# Patient Record
Sex: MALE | Race: WHITE | URBAN - METROPOLITAN AREA
[De-identification: names, ages, dates, MRNs, and addresses within clinical notes are randomized per-mention and may not be internally consistent; named-entity substitution may affect disease eponyms.]

---

## 2022-09-03 ENCOUNTER — HOSPITAL ENCOUNTER (EMERGENCY)
Age: 77
Discharge: HOME | End: 2022-09-03
Attending: EMERGENCY MEDICINE
Payer: MEDICARE

## 2022-09-03 ENCOUNTER — APPOINTMENT (OUTPATIENT)
Dept: GENERAL RADIOLOGY | Age: 77
End: 2022-09-03
Payer: MEDICARE

## 2022-09-03 VITALS
WEIGHT: 200 LBS | OXYGEN SATURATION: 95 % | DIASTOLIC BLOOD PRESSURE: 107 MMHG | BODY MASS INDEX: 29.62 KG/M2 | HEART RATE: 47 BPM | TEMPERATURE: 98.1 F | SYSTOLIC BLOOD PRESSURE: 193 MMHG | HEIGHT: 69 IN | RESPIRATION RATE: 14 BRPM

## 2022-09-03 DIAGNOSIS — M25.512 ACUTE PAIN OF LEFT SHOULDER: Primary | ICD-10-CM

## 2022-09-03 LAB
ANION GAP SERPL CALCULATED.3IONS-SCNC: 11 MMOL/L (ref 3–16)
BASE EXCESS VENOUS: 5.6 MMOL/L (ref -2–3)
BASOPHILS ABSOLUTE: 0.1 K/UL (ref 0–0.2)
BASOPHILS RELATIVE PERCENT: 0.5 %
BUN BLDV-MCNC: 19 MG/DL (ref 7–20)
CALCIUM SERPL-MCNC: 8.8 MG/DL (ref 8.3–10.6)
CARBOXYHEMOGLOBIN: 0.8 % (ref 0–1.5)
CHLORIDE BLD-SCNC: 103 MMOL/L (ref 99–110)
CO2: 28 MMOL/L (ref 21–32)
CREAT SERPL-MCNC: 0.7 MG/DL (ref 0.8–1.3)
EKG ATRIAL RATE: 53 BPM
EKG DIAGNOSIS: NORMAL
EKG P AXIS: 16 DEGREES
EKG P-R INTERVAL: 132 MS
EKG Q-T INTERVAL: 484 MS
EKG QRS DURATION: 96 MS
EKG QTC CALCULATION (BAZETT): 454 MS
EKG R AXIS: -14 DEGREES
EKG T AXIS: 34 DEGREES
EKG VENTRICULAR RATE: 53 BPM
EOSINOPHILS ABSOLUTE: 0.1 K/UL (ref 0–0.6)
EOSINOPHILS RELATIVE PERCENT: 0.8 %
GFR AFRICAN AMERICAN: >60
GFR NON-AFRICAN AMERICAN: >60
GLUCOSE BLD-MCNC: 104 MG/DL (ref 70–99)
HCO3 VENOUS: 30.3 MMOL/L (ref 24–28)
HCT VFR BLD CALC: 39.2 % (ref 40.5–52.5)
HEMOGLOBIN, VEN, REDUCED: 8.4 %
HEMOGLOBIN: 13.5 G/DL (ref 13.5–17.5)
LACTIC ACID: 1.6 MMOL/L (ref 0.4–2)
LYMPHOCYTES ABSOLUTE: 2.2 K/UL (ref 1–5.1)
LYMPHOCYTES RELATIVE PERCENT: 20.9 %
MCH RBC QN AUTO: 31.3 PG (ref 26–34)
MCHC RBC AUTO-ENTMCNC: 34.3 G/DL (ref 31–36)
MCV RBC AUTO: 91.2 FL (ref 80–100)
METHEMOGLOBIN VENOUS: 0.1 % (ref 0–1.5)
MONOCYTES ABSOLUTE: 0.7 K/UL (ref 0–1.3)
MONOCYTES RELATIVE PERCENT: 6.3 %
NEUTROPHILS ABSOLUTE: 7.6 K/UL (ref 1.7–7.7)
NEUTROPHILS RELATIVE PERCENT: 71.5 %
O2 SAT, VEN: 92 %
PCO2, VEN: 43.9 MMHG (ref 41–51)
PDW BLD-RTO: 15.3 % (ref 12.4–15.4)
PH VENOUS: 7.45 (ref 7.35–7.45)
PLATELET # BLD: 172 K/UL (ref 135–450)
PMV BLD AUTO: 8.4 FL (ref 5–10.5)
PO2, VEN: 60.1 MMHG (ref 25–40)
POTASSIUM REFLEX MAGNESIUM: 3.8 MMOL/L (ref 3.5–5.1)
PRO-BNP: 252 PG/ML (ref 0–449)
RBC # BLD: 4.3 M/UL (ref 4.2–5.9)
REASON FOR REJECTION: NORMAL
REJECTED TEST: NORMAL
SODIUM BLD-SCNC: 142 MMOL/L (ref 136–145)
TCO2 CALC VENOUS: 32 MMOL/L
TROPONIN: <0.01 NG/ML
TROPONIN: <0.01 NG/ML
WBC # BLD: 10.7 K/UL (ref 4–11)

## 2022-09-03 PROCEDURE — 93005 ELECTROCARDIOGRAM TRACING: CPT | Performed by: EMERGENCY MEDICINE

## 2022-09-03 PROCEDURE — 73030 X-RAY EXAM OF SHOULDER: CPT

## 2022-09-03 PROCEDURE — 85025 COMPLETE CBC W/AUTO DIFF WBC: CPT

## 2022-09-03 PROCEDURE — 82803 BLOOD GASES ANY COMBINATION: CPT

## 2022-09-03 PROCEDURE — 71045 X-RAY EXAM CHEST 1 VIEW: CPT

## 2022-09-03 PROCEDURE — 84484 ASSAY OF TROPONIN QUANT: CPT

## 2022-09-03 PROCEDURE — 83605 ASSAY OF LACTIC ACID: CPT

## 2022-09-03 PROCEDURE — 83880 ASSAY OF NATRIURETIC PEPTIDE: CPT

## 2022-09-03 PROCEDURE — 99285 EMERGENCY DEPT VISIT HI MDM: CPT

## 2022-09-03 PROCEDURE — 36415 COLL VENOUS BLD VENIPUNCTURE: CPT

## 2022-09-03 PROCEDURE — 80048 BASIC METABOLIC PNL TOTAL CA: CPT

## 2022-09-03 ASSESSMENT — PAIN DESCRIPTION - ORIENTATION: ORIENTATION: LEFT;MID

## 2022-09-03 ASSESSMENT — PAIN SCALES - GENERAL: PAINLEVEL_OUTOF10: 3

## 2022-09-03 ASSESSMENT — PAIN DESCRIPTION - FREQUENCY: FREQUENCY: INTERMITTENT

## 2022-09-03 ASSESSMENT — PAIN - FUNCTIONAL ASSESSMENT
PAIN_FUNCTIONAL_ASSESSMENT: 0-10
PAIN_FUNCTIONAL_ASSESSMENT: ACTIVITIES ARE NOT PREVENTED

## 2022-09-03 ASSESSMENT — ENCOUNTER SYMPTOMS
SHORTNESS OF BREATH: 0
VOMITING: 0
NAUSEA: 0
EYES NEGATIVE: 1
RHINORRHEA: 0
COUGH: 0
SORE THROAT: 0
RESPIRATORY NEGATIVE: 1
ABDOMINAL PAIN: 0

## 2022-09-03 ASSESSMENT — PAIN DESCRIPTION - ONSET: ONSET: SUDDEN

## 2022-09-03 ASSESSMENT — PAIN DESCRIPTION - LOCATION: LOCATION: CHEST

## 2022-09-03 ASSESSMENT — PAIN DESCRIPTION - PAIN TYPE: TYPE: ACUTE PAIN

## 2022-09-03 ASSESSMENT — PAIN DESCRIPTION - DESCRIPTORS: DESCRIPTORS: ACHING

## 2022-09-03 NOTE — DISCHARGE INSTRUCTIONS
As discussed, the evaluation of your heart was very reassuring, and your pain seems to be localized in the left shoulder, without any evidence of bony injury. Please continue taking all home medications as prescribed. Please return to the emergency department for worsening symptoms or other concerns.

## 2022-09-03 NOTE — ED PROVIDER NOTES
[address] [facility]          ATTENDING PHYSICIAN NOTE       Date of evaluation: 9/3/2022    Chief Complaint     Chest Pain (Pt states having mid/left chest pain that began around 3 am. Pain radiates to L shoulder. ) and Shoulder Pain      History of Present Illness     Michoacano Xie is a 78 y.o. male who presents to the emergency department by EMS from his long-term care facility with complaints of left shoulder pain. EMS reports that they were called to the nursing facility for chest pain. The patient describes to me that he has no chest pain, but he has pain in the anterior aspect of his left shoulder. Patient does not recall having any difficulties with shoulder pain in the past.  He states that he awoke at about 3:00 in the morning with a \"twinge\" of achy pain in the left shoulder. He describes his pain as 3 out of 10 in intensity, constant since onset, without exacerbating or alleviating factors. He denies any radiation of pain into the chest or into the back, neck, or jaw. He does feel that it is worse with certain movements of the arm. He does not recall any trauma or strain to the shoulder or arm recently. Patient states that he told the nurses aide who came to check on him about this pain, \"and then I found myself here. \"  He denies any shortness of breath. He denies any nausea or vomiting. He denies any dizziness or lightheadedness. On review of records, it appears that the patient has recently moved from a long-term care facility in [place] to here in [place] to be closer to family. There are no records available in our system, but his problem list from the nursing facility does list hypertension, dementia, and coronary artery disease, among others. Review of Systems     Review of Systems   Constitutional: Negative. Negative for activity change, chills, fatigue and fever. HENT: Negative. Negative for congestion, rhinorrhea and sore throat. Eyes: Negative. Negative for visual disturbance. Respiratory: Negative. Negative for cough and shortness of breath. Cardiovascular: Negative. Negative for chest pain, palpitations and leg swelling. Gastrointestinal:  Negative for abdominal pain, nausea and vomiting. Genitourinary:  Negative for difficulty urinating and dysuria. Musculoskeletal:  Positive for arthralgias. Left shoulder pain   Skin: Negative. Neurological: Negative. Negative for dizziness, light-headedness and headaches. Psychiatric/Behavioral: Negative. Past Medical, Surgical, Family, and Social History     He has a past medical history of Hypertension. He has a past surgical history that includes Appendectomy. His family history is not on file. He reports that he has never smoked. He has never used smokeless tobacco. He reports current alcohol use. He reports that he does not currently use drugs. Medications     There are no discharge medications for this patient. Allergies     He has No Known Allergies. Physical Exam     INITIAL VITALS: BP: (!) 158/98, Temp: 98.1 °F (36.7 °C), Heart Rate: 50, Resp: 16, SpO2: 95 %     General: Well appearing older gentleman. Soft-spoken, but pleasantly conversational, and in NAD. HEENT: Pupils are equal, round, and reactive to light. Extraocular muscles are intact. Conjunctivae are clear and moist. No redness or drainage from the eyes. No drainage from the nose. The oropharynx appeared to be normal.    Neck: Supple, with full range of motion. Chest: Not tender to palpation. Cardiovascular: Normal S1-S2 without murmur rub or gallop. 2+ radial pulses bilaterally. Respiratory: Unlabored breathing with equal chest rise and fall. Lungs are clear to auscultation bilaterally. No adventitious lung sounds heard. Abdomen: Soft and nontender, without guarding or rebound tenderness. No masses or hepatosplenomegaly.     Skin: Warm and dry, without rashes or ecchymoses, lacerations or abrasions. Neuro: Alert and oriented to person and place, although not to time. No focal neurologic deficits are noted. Extremities: Warm and well-perfused, without clubbing, cyanosis, or edema. The patient moves all extremities equally. On focused examination of the left upper extremity, there is no obvious deformity. The patient has full range of motion without limitation by pain, although he does have some pain in the anterior shoulder with internal rotation. He has mild tenderness to palpation over the anterior joint line. No edema or effusion. Psych: The patient's mood and affect are generally within normal limits for their presentation. Diagnostic Results     EKG   Sinus bradycardia with a ventricular rate of 53 bpm.  Leftward axis. Normal intervals, with WV interval 132 ms, QRS duration 96 ms,  ms. There is poor R wave progression in the anterior precordial leads. No ST segment changes are noted. There is no prior EKG to compare to in our system. RADIOLOGY:  XR CHEST PORTABLE   Final Result   1. No acute disease. XR SHOULDER LEFT (MIN 2 VIEWS)   Final Result   1. No acute osseous abnormality.           LABS:   Results for orders placed or performed during the hospital encounter of 40/72/83   Basic Metabolic Panel w/ Reflex to MG   Result Value Ref Range    Sodium 142 136 - 145 mmol/L    Potassium reflex Magnesium 3.8 3.5 - 5.1 mmol/L    Chloride 103 99 - 110 mmol/L    CO2 28 21 - 32 mmol/L    Anion Gap 11 3 - 16    Glucose 104 (H) 70 - 99 mg/dL    BUN 19 7 - 20 mg/dL    Creatinine 0.7 (L) 0.8 - 1.3 mg/dL    GFR Non-African American >60 >60    GFR African American >60 >60    Calcium 8.8 8.3 - 10.6 mg/dL   Brain Natriuretic Peptide   Result Value Ref Range    Pro- 0 - 449 pg/mL   Troponin   Result Value Ref Range    Troponin <0.01 <0.01 ng/mL   Blood gas, venous (Lab)   Result Value Ref Range    pH, Sree 7.448 7.350 - 7.450    pCO2, Sree 43.9 41.0 - 51.0 mmHg    pO2, Sree 60.1 (H) 25.0 - 40.0 mmHg    HCO3, Venous 30.3 (H) 24.0 - 28.0 mmol/L    Base Excess, Sree 5.6 (H) -2.0 - 3.0 mmol/L    O2 Sat, Sree 92 Not established %    Carboxyhemoglobin 0.8 0.0 - 1.5 %    MetHgb, Sree 0.1 0.0 - 1.5 %    TC02 (Calc), Sree 32 mmol/L    Hemoglobin, Sree, Reduced 8.40 %   Lactic Acid   Result Value Ref Range    Lactic Acid 1.6 0.4 - 2.0 mmol/L   SPECIMEN REJECTION   Result Value Ref Range    Rejected Test CBCWD     Reason for Rejection see below    CBC with Auto Differential   Result Value Ref Range    WBC 10.7 4.0 - 11.0 K/uL    RBC 4.30 4.20 - 5.90 M/uL    Hemoglobin 13.5 13.5 - 17.5 g/dL    Hematocrit 39.2 (L) 40.5 - 52.5 %    MCV 91.2 80.0 - 100.0 fL    MCH 31.3 26.0 - 34.0 pg    MCHC 34.3 31.0 - 36.0 g/dL    RDW 15.3 12.4 - 15.4 %    Platelets 581 407 - 711 K/uL    MPV 8.4 5.0 - 10.5 fL    Neutrophils % 71.5 %    Lymphocytes % 20.9 %    Monocytes % 6.3 %    Eosinophils % 0.8 %    Basophils % 0.5 %    Neutrophils Absolute 7.6 1.7 - 7.7 K/uL    Lymphocytes Absolute 2.2 1.0 - 5.1 K/uL    Monocytes Absolute 0.7 0.0 - 1.3 K/uL    Eosinophils Absolute 0.1 0.0 - 0.6 K/uL    Basophils Absolute 0.1 0.0 - 0.2 K/uL   Troponin   Result Value Ref Range    Troponin <0.01 <0.01 ng/mL   EKG 12 Lead   Result Value Ref Range    Ventricular Rate 53 BPM    Atrial Rate 53 BPM    P-R Interval 132 ms    QRS Duration 96 ms    Q-T Interval 484 ms    QTc Calculation (Bazett) 454 ms    P Axis 16 degrees    R Axis -14 degrees    T Axis 34 degrees    Diagnosis       EKG performed in ER and to be interpreted by ER physician. Confirmed by MD, ER ([id number]),  Meryle Lay ([id number]) on 9/3/2022 7:48:03 AM       RECENT VITALS:  BP: (!) 193/107, Temp: 98.1 °F (36.7 °C), Heart Rate: (!) 47, Resp: 14, SpO2: 95 %     Procedures       ED Course     Nursing Notes, Past Medical Hx, Past Surgical Hx, Social Hx, Allergies, and Family Hx were reviewed.     The patient was given the following medications:  No orders of the defined types were placed in this encounter. CONSULTS:  None    MEDICAL DECISION MAKING / ASSESSMENT / PLAN     Jennifer Santos is a 78 y.o. male who presents to the emergency department from his long-term care facility with complaints of anterior left shoulder pain. According to EMS, the nursing facility called in for chest pain, but the patient firmly and repeatedly denies any chest pain to me. He has reproducible tenderness over the anterior joint line of the shoulder, with no radiation of pain into the chest, neck, or jaw. His pain is reproduced with certain maneuvers, including internal rotation of the shoulder. Given his age and some risk factors, cardiac work-up was pursued, but this was reassuring, with no abnormalities. Shoulder films show no acute bony abnormality. The patient is thereafter considered stable for discharge. Clinical Impression     1. Acute pain of left shoulder        Disposition     PATIENT REFERRED TO:  [facility] Emergency Department  [address] [place] [address]  [phone]    If symptoms worsen    DISCHARGE MEDICATIONS:  There are no discharge medications for this patient. DISPOSITION Decision To Discharge    (Please note that portions of this note were completed with voice recognition software.   Efforts were made to edit the dictations but occasionally words are mis-transcribed.)     Tara Wright MD  09/05/22 [id number]

## 2022-09-03 NOTE — ED NOTES
Pt discharged with written instructions. Pts son verbalizes understanding. Heplock removed and pt walked to [facility] with assistance.      Mane Saavedra RN  09/03/22 [id number]

## 2022-10-24 ENCOUNTER — ANESTHESIA EVENT (OUTPATIENT)
Dept: ENDOSCOPY | Age: 77
End: 2022-10-24
Payer: MEDICARE

## 2022-10-25 ENCOUNTER — HOSPITAL ENCOUNTER (OUTPATIENT)
Age: 77
Setting detail: OUTPATIENT SURGERY
Discharge: HOME | End: 2022-10-25
Attending: INTERNAL MEDICINE | Admitting: INTERNAL MEDICINE
Payer: MEDICARE

## 2022-10-25 ENCOUNTER — ANESTHESIA (OUTPATIENT)
Dept: ENDOSCOPY | Age: 77
End: 2022-10-25
Payer: MEDICARE

## 2022-10-25 VITALS
SYSTOLIC BLOOD PRESSURE: 153 MMHG | DIASTOLIC BLOOD PRESSURE: 83 MMHG | RESPIRATION RATE: 16 BRPM | TEMPERATURE: 96.8 F | HEART RATE: 59 BPM | OXYGEN SATURATION: 97 %

## 2022-10-25 DIAGNOSIS — Z87.19 HISTORY OF ULCERATIVE COLITIS: ICD-10-CM

## 2022-10-25 LAB — C DIFF TOXIN/ANTIGEN: NORMAL

## 2022-10-25 PROCEDURE — 88305 TISSUE EXAM BY PATHOLOGIST: CPT

## 2022-10-25 PROCEDURE — 2580000003 HC RX 258: Performed by: INTERNAL MEDICINE

## 2022-10-25 PROCEDURE — 87449 NOS EACH ORGANISM AG IA: CPT

## 2022-10-25 PROCEDURE — 2500000003 HC RX 250 WO HCPCS: Performed by: NURSE ANESTHETIST, CERTIFIED REGISTERED

## 2022-10-25 PROCEDURE — 3700000000 HC ANESTHESIA ATTENDED CARE: Performed by: INTERNAL MEDICINE

## 2022-10-25 PROCEDURE — 2709999900 HC NON-CHARGEABLE SUPPLY: Performed by: INTERNAL MEDICINE

## 2022-10-25 PROCEDURE — 6360000002 HC RX W HCPCS: Performed by: NURSE ANESTHETIST, CERTIFIED REGISTERED

## 2022-10-25 PROCEDURE — 87493 C DIFF AMPLIFIED PROBE: CPT

## 2022-10-25 PROCEDURE — 7100000010 HC PHASE II RECOVERY - FIRST 15 MIN: Performed by: INTERNAL MEDICINE

## 2022-10-25 PROCEDURE — 7100000011 HC PHASE II RECOVERY - ADDTL 15 MIN: Performed by: INTERNAL MEDICINE

## 2022-10-25 PROCEDURE — 87324 CLOSTRIDIUM AG IA: CPT

## 2022-10-25 PROCEDURE — 3609010300 HC COLONOSCOPY W/BIOPSY SINGLE/MULTIPLE: Performed by: INTERNAL MEDICINE

## 2022-10-25 PROCEDURE — 3700000001 HC ADD 15 MINUTES (ANESTHESIA): Performed by: INTERNAL MEDICINE

## 2022-10-25 RX ORDER — ASPIRIN 81 MG/1
TABLET, COATED ORAL
COMMUNITY
Start: 2022-07-31

## 2022-10-25 RX ORDER — TAMSULOSIN HYDROCHLORIDE 0.4 MG/1
0.4 CAPSULE ORAL NIGHTLY
COMMUNITY
Start: 2022-04-12

## 2022-10-25 RX ORDER — ASPIRIN 81 MG/1
81 TABLET ORAL DAILY
COMMUNITY
Start: 2022-04-12

## 2022-10-25 RX ORDER — OXYCODONE HYDROCHLORIDE 5 MG/1
10 TABLET ORAL PRN
Status: CANCELLED | OUTPATIENT
Start: 2022-10-25 | End: 2022-10-25

## 2022-10-25 RX ORDER — DIPHENHYDRAMINE HYDROCHLORIDE 50 MG/ML
12.5 INJECTION INTRAMUSCULAR; INTRAVENOUS
Status: CANCELLED | OUTPATIENT
Start: 2022-10-25 | End: 2022-10-26

## 2022-10-25 RX ORDER — AMMONIUM LACTATE 12 G/100G
CREAM TOPICAL
COMMUNITY
Start: 2022-07-31

## 2022-10-25 RX ORDER — LANOLIN ALCOHOL/MO/W.PET/CERES
CREAM (GRAM) TOPICAL
COMMUNITY
Start: 2022-07-31

## 2022-10-25 RX ORDER — ONDANSETRON 2 MG/ML
4 INJECTION INTRAMUSCULAR; INTRAVENOUS
Status: CANCELLED | OUTPATIENT
Start: 2022-10-25

## 2022-10-25 RX ORDER — SIMVASTATIN 40 MG
TABLET ORAL
COMMUNITY
Start: 2022-10-24

## 2022-10-25 RX ORDER — OMEPRAZOLE 20 MG/1
CAPSULE, DELAYED RELEASE ORAL
COMMUNITY
Start: 2022-10-24

## 2022-10-25 RX ORDER — OLANZAPINE 5 MG/1
TABLET ORAL
COMMUNITY
Start: 2022-08-03

## 2022-10-25 RX ORDER — TAMSULOSIN HYDROCHLORIDE 0.4 MG/1
CAPSULE ORAL
COMMUNITY
Start: 2022-10-24

## 2022-10-25 RX ORDER — SODIUM CHLORIDE 0.9 % (FLUSH) 0.9 %
5-40 SYRINGE (ML) INJECTION PRN
Status: DISCONTINUED | OUTPATIENT
Start: 2022-10-25 | End: 2022-10-25 | Stop reason: HOSPADM

## 2022-10-25 RX ORDER — FINASTERIDE 5 MG/1
TABLET, FILM COATED ORAL
COMMUNITY
Start: 2022-10-24

## 2022-10-25 RX ORDER — SIMVASTATIN 20 MG
20 TABLET ORAL NIGHTLY
COMMUNITY
Start: 2022-04-12

## 2022-10-25 RX ORDER — SODIUM CHLORIDE, SODIUM LACTATE, POTASSIUM CHLORIDE, CALCIUM CHLORIDE 600; 310; 30; 20 MG/100ML; MG/100ML; MG/100ML; MG/100ML
INJECTION, SOLUTION INTRAVENOUS CONTINUOUS
Status: DISCONTINUED | OUTPATIENT
Start: 2022-10-25 | End: 2022-10-25 | Stop reason: HOSPADM

## 2022-10-25 RX ORDER — SODIUM, POTASSIUM,MAG SULFATES 17.5-3.13G
SOLUTION, RECONSTITUTED, ORAL ORAL
COMMUNITY
Start: 2022-09-20

## 2022-10-25 RX ORDER — FINASTERIDE 5 MG/1
5 TABLET, FILM COATED ORAL DAILY
COMMUNITY
Start: 2022-04-12

## 2022-10-25 RX ORDER — SODIUM CHLORIDE 0.9 % (FLUSH) 0.9 %
5-40 SYRINGE (ML) INJECTION EVERY 12 HOURS SCHEDULED
Status: DISCONTINUED | OUTPATIENT
Start: 2022-10-25 | End: 2022-10-25 | Stop reason: HOSPADM

## 2022-10-25 RX ORDER — GLYCOPYRROLATE 0.2 MG/ML
INJECTION INTRAMUSCULAR; INTRAVENOUS PRN
Status: DISCONTINUED | OUTPATIENT
Start: 2022-10-25 | End: 2022-10-25 | Stop reason: SDUPTHER

## 2022-10-25 RX ORDER — SODIUM CHLORIDE, SODIUM LACTATE, POTASSIUM CHLORIDE, CALCIUM CHLORIDE 600; 310; 30; 20 MG/100ML; MG/100ML; MG/100ML; MG/100ML
INJECTION, SOLUTION INTRAVENOUS ONCE
Status: COMPLETED | OUTPATIENT
Start: 2022-10-25 | End: 2022-10-25

## 2022-10-25 RX ORDER — NITROFURANTOIN 25; 75 MG/1; MG/1
CAPSULE ORAL
COMMUNITY
Start: 2022-09-03

## 2022-10-25 RX ORDER — SODIUM CHLORIDE 9 MG/ML
INJECTION, SOLUTION INTRAVENOUS PRN
Status: CANCELLED | OUTPATIENT
Start: 2022-10-25

## 2022-10-25 RX ORDER — METHYLPHENIDATE HYDROCHLORIDE 5 MG/1
2.5 TABLET ORAL DAILY
COMMUNITY
Start: 2022-04-12

## 2022-10-25 RX ORDER — LABETALOL HYDROCHLORIDE 5 MG/ML
5 INJECTION, SOLUTION INTRAVENOUS EVERY 10 MIN PRN
Status: CANCELLED | OUTPATIENT
Start: 2022-10-25

## 2022-10-25 RX ORDER — LANOLIN ALCOHOL/MO/W.PET/CERES
6 CREAM (GRAM) TOPICAL NIGHTLY
COMMUNITY
Start: 2022-04-12

## 2022-10-25 RX ORDER — SODIUM CHLORIDE 9 MG/ML
INJECTION, SOLUTION INTRAVENOUS PRN
Status: DISCONTINUED | OUTPATIENT
Start: 2022-10-25 | End: 2022-10-25 | Stop reason: HOSPADM

## 2022-10-25 RX ORDER — PROPOFOL 10 MG/ML
INJECTION, EMULSION INTRAVENOUS PRN
Status: DISCONTINUED | OUTPATIENT
Start: 2022-10-25 | End: 2022-10-25 | Stop reason: SDUPTHER

## 2022-10-25 RX ORDER — LOSARTAN POTASSIUM 25 MG/1
TABLET ORAL
COMMUNITY
Start: 2022-10-24

## 2022-10-25 RX ORDER — OXYCODONE HYDROCHLORIDE 5 MG/1
5 TABLET ORAL PRN
Status: CANCELLED | OUTPATIENT
Start: 2022-10-25 | End: 2022-10-25

## 2022-10-25 RX ORDER — LOSARTAN POTASSIUM 50 MG/1
50 TABLET ORAL DAILY
COMMUNITY
Start: 2022-04-12

## 2022-10-25 RX ORDER — TRAZODONE HYDROCHLORIDE 50 MG/1
50 TABLET ORAL
COMMUNITY
Start: 2022-04-12

## 2022-10-25 RX ORDER — PREDNISONE 20 MG/1
TABLET ORAL
COMMUNITY
Start: 2022-10-24

## 2022-10-25 RX ORDER — SIMVASTATIN 20 MG
TABLET ORAL
COMMUNITY
Start: 2022-08-05

## 2022-10-25 RX ORDER — LIDOCAINE HYDROCHLORIDE 10 MG/ML
0.1 INJECTION, SOLUTION EPIDURAL; INFILTRATION; INTRACAUDAL; PERINEURAL
Status: DISCONTINUED | OUTPATIENT
Start: 2022-10-25 | End: 2022-10-25 | Stop reason: HOSPADM

## 2022-10-25 RX ORDER — SODIUM CHLORIDE 0.9 % (FLUSH) 0.9 %
5-40 SYRINGE (ML) INJECTION EVERY 12 HOURS SCHEDULED
Status: CANCELLED | OUTPATIENT
Start: 2022-10-25

## 2022-10-25 RX ORDER — MEPERIDINE HYDROCHLORIDE 50 MG/ML
12.5 INJECTION INTRAMUSCULAR; INTRAVENOUS; SUBCUTANEOUS EVERY 5 MIN PRN
Status: CANCELLED | OUTPATIENT
Start: 2022-10-25

## 2022-10-25 RX ORDER — LIDOCAINE HYDROCHLORIDE 10 MG/ML
INJECTION, SOLUTION INFILTRATION; PERINEURAL PRN
Status: DISCONTINUED | OUTPATIENT
Start: 2022-10-25 | End: 2022-10-25 | Stop reason: SDUPTHER

## 2022-10-25 RX ORDER — TRAZODONE HYDROCHLORIDE 50 MG/1
TABLET ORAL
COMMUNITY
Start: 2022-08-05

## 2022-10-25 RX ORDER — SODIUM CHLORIDE 0.9 % (FLUSH) 0.9 %
5-40 SYRINGE (ML) INJECTION PRN
Status: CANCELLED | OUTPATIENT
Start: 2022-10-25

## 2022-10-25 RX ADMIN — PROPOFOL 10 MG: 10 INJECTION, EMULSION INTRAVENOUS at 14:00

## 2022-10-25 RX ADMIN — PROPOFOL 10 MG: 10 INJECTION, EMULSION INTRAVENOUS at 14:01

## 2022-10-25 RX ADMIN — PROPOFOL 10 MG: 10 INJECTION, EMULSION INTRAVENOUS at 13:53

## 2022-10-25 RX ADMIN — PROPOFOL 10 MG: 10 INJECTION, EMULSION INTRAVENOUS at 14:05

## 2022-10-25 RX ADMIN — LIDOCAINE HYDROCHLORIDE 50 MG: 10 INJECTION, SOLUTION INFILTRATION; PERINEURAL at 13:48

## 2022-10-25 RX ADMIN — PROPOFOL 10 MG: 10 INJECTION, EMULSION INTRAVENOUS at 13:55

## 2022-10-25 RX ADMIN — PROPOFOL 10 MG: 10 INJECTION, EMULSION INTRAVENOUS at 13:57

## 2022-10-25 RX ADMIN — PROPOFOL 10 MG: 10 INJECTION, EMULSION INTRAVENOUS at 14:04

## 2022-10-25 RX ADMIN — PROPOFOL 10 MG: 10 INJECTION, EMULSION INTRAVENOUS at 14:03

## 2022-10-25 RX ADMIN — GLYCOPYRROLATE 0.2 MG: 0.2 INJECTION, SOLUTION INTRAMUSCULAR; INTRAVENOUS at 13:44

## 2022-10-25 RX ADMIN — PROPOFOL 40 MG: 10 INJECTION, EMULSION INTRAVENOUS at 13:48

## 2022-10-25 RX ADMIN — SODIUM CHLORIDE, POTASSIUM CHLORIDE, SODIUM LACTATE AND CALCIUM CHLORIDE: 600; 310; 30; 20 INJECTION, SOLUTION INTRAVENOUS at 13:07

## 2022-10-25 RX ADMIN — PROPOFOL 10 MG: 10 INJECTION, EMULSION INTRAVENOUS at 13:59

## 2022-10-25 RX ADMIN — PROPOFOL 10 MG: 10 INJECTION, EMULSION INTRAVENOUS at 13:50

## 2022-10-25 RX ADMIN — PROPOFOL 10 MG: 10 INJECTION, EMULSION INTRAVENOUS at 13:52

## 2022-10-25 RX ADMIN — PROPOFOL 10 MG: 10 INJECTION, EMULSION INTRAVENOUS at 13:49

## 2022-10-25 RX ADMIN — PROPOFOL 10 MG: 10 INJECTION, EMULSION INTRAVENOUS at 13:56

## 2022-10-25 NOTE — PROCEDURES
Colonoscopy Procedure  Note          Patient: Aleisha Saldaña  : 1945      Procedure: Colonoscopy with biopsies, stool sample obtained for C. difficile testing    Date:  10/25/2022    Primary Care Physician: TORRI Nath - CNP     Operative surgeon: Re Cardenas MD  Previous Colonoscopy: Yes  Consent: I explained and discussed the risk, benefits and alternatives for the procedure with the patient and obtained the patient's consent for the procedure. We discussed the specific risks including bleeding, perforation, post-procedure abdominal pain, and missed lesions which could lead to interval colorectal cancers. History:       Past Medical History:   Diagnosis Date    Hypertension     Poor memory       Preoperative Diagnosis: History of ulcerative colitis [Z87.19]  Post Operative Diagnosis: Ulcerative colitis extending into the transverse colon  ASA: 3  SEDATION: MAC      Procedures Performed: Colonoscopy   Scope Type: Pediatric    Procedure Details:      With the patient in left lateral decubitus position the endoscope was inserted through the anorectal area into the rectum. The scope was then advanced through the length of the colon to the cecum and terminal ileum. The quality of preparation was adequate. The scope was carefully withdrawn with careful inspection. Images were taken of the multiple segments of colon, cecum, IC valve, rectum, terminal ileum      Cecum Intubated: Yes  EBL: minimal to none  Complications:  no complications were noted  Post-operative Findings: Perianal exam unremarkable, digital rectal exam demonstrated mild fullness in the rectum    From the rectum extending to approximately 50 cm there was rather diffuse colitis Amato class III. Biopsies were taken throughout to further assess. There is evidence of pseudomembranes and a C. difficile collection for stool was taken.     At 30 cm there is 2 polypoid lesions measuring 1 cm in size that were biopsied to rule out dysplasia likely pseudopolyps    At 65 cm there is some granular atypically a mucosa that was biopsied suspect colitis    There was scattered erythema and granularity in the right colon however less extensive signs of colitis biopsies were taken to further assess    There was a prominent cecal patch with inflammation    The terminal ileum was intubated was normal    Plan: Left-sided colitis Amato class III with extension into the right colon with less severity Amato class II till about 65 cm with a cecal patch. Follow-up with Dr. Renee Lewis for further management. He is currently on steroids will likely need biologic therapy going forward. C diff testing pending. Signed By: MD Maryuri Leon MD,   [facility]  10/25/2022      Please note that some or all of this record was generated using voice recognition software. If there are any questions about the content of this document, please contact the author as some errors in translation may have occurred.

## 2022-10-25 NOTE — DISCHARGE INSTRUCTIONS
PATIENT INSTRUCTIONS  POST-SEDATION    Patricia Francisco J          IMMEDIATELY FOLLOWING PROCEDURE:    Do not drive or operate machinery for the first twenty four hours after surgery. Do not make any important decisions for twenty four hours after surgery or while taking narcotic pain medications or sedatives. You should NOT BE LEFT UNATTENDED OR ALONE. A responsible adult should be with you for the rest of the day of your procedure and also during the night for your protection and safety. You may experience some light headedness. Rest at home with activity as tolerated. You may not need to go to bed, but it is important to rest for the next 24 hours. You should not engage in athletic sports such as basketball, volleyball, jogging, skating, or activities requiring refined motor skills for 24 hours. If you develop intractable nausea and vomiting or a severe headache please notify your doctor immediately. You are not expected to have any fever, but if you feel warm, take your temperature. If you have a fever 101 degrees or higher, call your doctor. If you have had an Endoscopy:   *Eat lightly for your first meal and gradually resume your normal / prescribed diet. *If you have had a colonoscopy, do not expect a normal bowel movement for approximately three days due to the cleansing of the large intestine prior to colonoscopy. ONCE YOU ARE HOME, IF YOU SHOULD HAVE:  Difficulty in breathing, persistent nausea or vomiting, bleeding you feel is excessive, or pain that is unusual, increased abdominal bloating, or any swelling, fever / chills, call your physician. If you cannot contact your physician, but feel that your signs and symptoms need a physician's attention, go to the Emergency Department. FOLLOW-UP:    Please follow up with @[email or web address] as scheduled or needed. Dr. Eliceo Wadsworth MD will call you with the biopsy findings. Call Dr. Eliceo Wadsworth MD if there are any GI concerns. [phone]    Repeat Colonoscopy ***    You may be receiving a follow up phone call to ask about your care. Colonoscopy: What to Expect at [address] [place] [address]  After you have a colonoscopy, you will stay at the clinic for 1 to 2 hours until the medicines wear off. Then you can go home. But you will need to arrange for a ride. Your doctor will tell you when you can eat and do your other usual activities. Your doctor will talk to you about when you will need your next colonoscopy. Your doctor can help you decide how often you need to be checked. This will depend on the results of your test and your risk for colorectal cancer. After the test, you may be bloated or have gas pains. You may need to pass gas. If a biopsy was done or a polyp was removed, you may have streaks of blood in your stool (feces) for a few days. Problems such as heavy rectal bleeding may not occur until several weeks after the test. This isn't common. But it can happen after polyps are removed. This care sheet gives you a general idea about how long it will take for you to recover. But each person recovers at a different pace. Follow the steps below to get better as quickly as possible. How can you care for yourself at home? Activity    Rest when you feel tired. You can do your normal activities when it feels okay to do so. Diet    Follow your doctor's directions for eating. Unless your doctor has told you not to, drink plenty of fluids. This helps to replace the fluids that were lost during the colon prep. Do not drink alcohol. Medicines    Your doctor will tell you if and when you can restart your medicines. He or she will also give you instructions about taking any new medicines. If you take blood thinners, such as warfarin (Coumadin), clopidogrel (Plavix), or aspirin, be sure to talk to your doctor. He or she will tell you if and when to start taking those medicines again.  Make sure that you understand exactly what your doctor wants you to do. If polyps were removed or a biopsy was done during the test, your doctor may tell you not to take aspirin or other anti-inflammatory medicines for a few days. These include ibuprofen (Advil, Motrin) and naproxen (Aleve). Other instructions    For your safety, do not drive or operate machinery until the medicine wears off and you can think clearly. Your doctor may tell you not to drive or operate machinery until the day after your test.     Do not sign legal documents or make major decisions until the medicine wears off and you can think clearly. The anesthesia can make it hard for you to fully understand what you are agreeing to. Follow-up care is a key part of your treatment and safety. Be sure to make and go to all appointments, and call your doctor if you are having problems. It's also a good idea to know your test results and keep a list of the medicines you take. When should you call for help? Call 911 anytime you think you may need emergency care. For example, call if:    You passed out (lost consciousness). You pass maroon or bloody stools. You have trouble breathing. Call your doctor now or seek immediate medical care if:    You have pain that does not get better after you take pain medicine. You are sick to your stomach or cannot drink fluids. You have new or worse belly pain. You have blood in your stools. You have a fever. You cannot pass stools or gas. Watch closely for changes in your health, and be sure to contact your doctor if you have any problems. Where can you learn more? Go to https://[employer]. org and sign in to your [employer] account. Enter E264 in the [employer] box to learn more about \"Colonoscopy: What to Expect at Home. \"     If you do not have an account, please click on the \"Sign Up Now\" link. Current as of:  May 12, 2017  Content Version: 11.6  © 4573-1469 Healthwise, Incorporated. Care instructions adapted under license by [facility] ([facility]). If you have questions about a medical condition or this instruction, always ask your healthcare professional. [address] any warranty or liability for your use of this information.

## 2022-10-25 NOTE — H&P
[facility]   Pre-operative History and Physical    Patient: Milena Sanchez  : 1945  Acct#:     HISTORY OF PRESENT ILLNESS:    The patient is a 78 y.o. male who presents for history of ulcerative colitis and rectal bleeding. Currently on steroids prednisone. Indications: History of ulcerative colitis    Past Medical History:        Diagnosis Date    Hypertension     Poor memory       Past Surgical History:        Procedure Laterality Date    APPENDECTOMY      CORONARY STENT PLACEMENT      NASAL SEPTUM SURGERY        Medications Prior to Admission:   No current facility-administered medications on file prior to encounter. Current Outpatient Medications on File Prior to Encounter   Medication Sig Dispense Refill    aspirin 81 MG EC tablet Take 81 mg by mouth daily      finasteride (PROSCAR) 5 MG tablet Take 5 mg by mouth daily      losartan (COZAAR) 50 MG tablet Take 50 mg by mouth daily      melatonin 3 MG TABS tablet Take 6 mg by mouth nightly      methylphenidate (RITALIN) 5 MG tablet Take 2.5 mg by mouth daily.       simvastatin (ZOCOR) 20 MG tablet Take 20 mg by mouth nightly      tamsulosin (FLOMAX) 0.4 MG capsule Take 0.4 mg by mouth nightly      traZODone (DESYREL) 50 MG tablet Take 50 mg by mouth At bedtime as needed      zinc oxide 13 % CREA Apply topically as needed      ammonium lactate (AMLACTIN) 12 % cream       ASPIRIN LOW DOSE 81 MG EC tablet TAKE 1 TABLET BY MOUTH EVERY DAY      finasteride (PROSCAR) 5 MG tablet       losartan (COZAAR) 25 MG tablet       melatonin 3 MG TABS tablet TAKE 1 TABLET BY MOUTH EVERY NIGHT AT BEDTIME      SUPREP BOWEL PREP KIT 17.5-3.13-1.6 GM/177ML SOLN solution MIX AND DRINK AS DIRECTED      nitrofurantoin, macrocrystal-monohydrate, (MACROBID) 100 MG capsule       OLANZapine (ZYPREXA) 5 MG tablet       omeprazole (PRILOSEC) 20 MG delayed release capsule       predniSONE (DELTASONE) 20 MG tablet       simvastatin (ZOCOR) 20 MG tablet simvastatin (ZOCOR) 40 MG tablet       tamsulosin (FLOMAX) 0.4 MG capsule       traZODone (DESYREL) 50 MG tablet           Allergies:  Patient has no known allergies. Social History:   Social History     Socioeconomic History    Marital status:      Spouse name: Not on file    Number of children: Not on file    Years of education: Not on file    Highest education level: Not on file   Occupational History    Not on file   Tobacco Use    Smoking status: Never    Smokeless tobacco: Never   Vaping Use    Vaping Use: Never used   Substance and Sexual Activity    Alcohol use: Not Currently    Drug use: Not Currently    Sexual activity: Not Currently   Other Topics Concern    Not on file   Social History Narrative    Not on file     Social Determinants of Health     Financial Resource Strain: Not on file   Food Insecurity: Not on file   Transportation Needs: Not on file   Physical Activity: Not on file   Stress: Not on file   Social Connections: Not on file   Intimate Partner Violence: Not on file   Housing Stability: Not on file      Family History:       Problem Relation Age of Onset    No Known Problems Mother     Cancer Father         PHYSICAL EXAM:      BP (!) 153/82   Pulse 66   Temp 96.8 °F (36 °C)   Resp 16   SpO2 95%  I        Heart:  Normal apical impulse, regular rate and rhythm, normal S1 and S2, no S3 or S4, and no murmur noted    Lungs:  No increased work of breathing, good air exchange, clear to auscultation bilaterally, no crackles or wheezing    Abdomen:  No scars, normal bowel sounds, soft, non-distended, non-tender, no masses palpated, no hepatosplenomegally      ASA Class  ASA 3 - Patient with moderate systemic disease with functional limitations    Mallampati Class: 3      ASSESSMENT AND PLAN:    1. Patient is a suitable candidate for endoscopic procedure and attendant anesthesia  2.   Risks, benefits, alternatives of procedure discussed in detail with patient including risks of bleeding, infection, perforation, risks of sedation, risks of missed lesions. The patient wishes to proceed.

## 2022-10-25 NOTE — ANESTHESIA PRE PROCEDURE
Department of Anesthesiology  Preprocedure Note       Name:  Mely Montiel   Age:  78 y.o.  :  1945                                          MRN:  [id number]         Date:  10/25/2022      Surgeon: Terry Castro):  Saran Thapa MD    Procedure: Procedure(s):  COLONOSCOPY    Medications prior to admission:   Prior to Admission medications    Not on File       Current medications:    Current Facility-Administered Medications   Medication Dose Route Frequency Provider Last Rate Last Admin    lidocaine PF 1 % injection 0.1 mL  0.1 mL IntraDERmal Once PRN Saran Thapa MD        lactated ringers infusion   IntraVENous Continuous Shanthi Rios MD        sodium chloride flush 0.9 % injection 5-40 mL  5-40 mL IntraVENous 2 times per day Shanthi Rios MD        sodium chloride flush 0.9 % injection 5-40 mL  5-40 mL IntraVENous PRN Shanthi Rios MD        0.9 % sodium chloride infusion   IntraVENous PRN Shanthi Rios MD           Allergies:  No Known Allergies    Problem List:  There is no problem list on file for this patient.       Past Medical History:        Diagnosis Date    Hypertension     Poor memory        Past Surgical History:        Procedure Laterality Date    APPENDECTOMY      CORONARY STENT PLACEMENT  2007    NASAL SEPTUM SURGERY         Social History:    Social History     Tobacco Use    Smoking status: Never    Smokeless tobacco: Never   Substance Use Topics    Alcohol use: Not Currently                                Counseling given: Not Answered      Vital Signs (Current):   Vitals:    10/25/22 1258   BP: (!) 153/82   Pulse: 66   Resp: 16   Temp: 96.8 °F (36 °C)   SpO2: 95%                                              BP Readings from Last 3 Encounters:   10/25/22 (!) 153/82   22 (!) 193/107       NPO Status: Time of last liquid consumption: 0300                        Time of last solid consumption: 1900                        Date of last liquid consumption: 10/25/22                        Date of last solid food consumption: 10/23/22    BMI:   Wt Readings from Last 3 Encounters:   09/03/22 200 lb (90.7 kg)     There is no height or weight on file to calculate BMI.    CBC:   Lab Results   Component Value Date/Time    WBC 10.7 09/03/2022 06:12 AM    RBC 4.30 09/03/2022 06:12 AM    HGB 13.5 09/03/2022 06:12 AM    HCT 39.2 09/03/2022 06:12 AM    MCV 91.2 09/03/2022 06:12 AM    RDW 15.3 09/03/2022 06:12 AM     09/03/2022 06:12 AM       CMP:   Lab Results   Component Value Date/Time     09/03/2022 04:49 AM    K 3.8 09/03/2022 04:49 AM     09/03/2022 04:49 AM    CO2 28 09/03/2022 04:49 AM    BUN 19 09/03/2022 04:49 AM    CREATININE 0.7 09/03/2022 04:49 AM    GFRAA >60 09/03/2022 04:49 AM    LABGLOM >60 09/03/2022 04:49 AM    GLUCOSE 104 09/03/2022 04:49 AM    CALCIUM 8.8 09/03/2022 04:49 AM       POC Tests: No results for input(s): POCGLU, POCNA, POCK, POCCL, POCBUN, POCHEMO, POCHCT in the last 72 hours.     Coags: No results found for: PROTIME, INR, APTT    HCG (If Applicable): No results found for: PREGTESTUR, PREGSERUM, HCG, HCGQUANT     ABGs: No results found for: PHART, PO2ART, FCS0HQI, AQU5YMX, BEART, R4IAADWC     Type & Screen (If Applicable):  No results found for: LABABO, LABRH    Drug/Infectious Status (If Applicable):  No results found for: HIV, HEPCAB    COVID-19 Screening (If Applicable): No results found for: COVID19        Anesthesia Evaluation  Patient summary reviewed no history of anesthetic complications:   Airway: Mallampati: II  TM distance: >3 FB   Neck ROM: full  Mouth opening: > = 3 FB   Dental: normal exam         Pulmonary:normal exam  breath sounds clear to auscultation      (-) COPD, asthma and sleep apnea                           Cardiovascular:  Exercise tolerance: good (>4 METS),   (+) hypertension:, CAD: obstructive, CABG/stent: no interval change,     (-)  angina and  SIBLEY      Rhythm: regular  Rate: normal Neuro/Psych:      (-) seizures and TIA           GI/Hepatic/Renal:   (+) GERD: well controlled,      (-) liver disease and no renal disease       Endo/Other:        (-) diabetes mellitus               Abdominal:             Vascular: negative vascular ROS. Other Findings:           Anesthesia Plan      general     ASA 3     (I discussed with the patient the risks and benefits of PIV, anesthesia, IV Narcotics, PACU. All questions were answered the patient agrees with the plan and wishes to proceed)  Induction: intravenous.                             Liborio Noble MD   10/25/2022

## 2022-10-25 NOTE — ANESTHESIA POSTPROCEDURE EVALUATION
Department of Anesthesiology  Postprocedure Note    Patient: Rosalie Fagan  MRN: [id number]  YOB: 1945  Date of evaluation: 10/25/2022      Procedure Summary     Date: 10/25/22 Room / Location: [address] [place] [address] 01 / [facility]    Anesthesia Start: 1343 Anesthesia Stop: 5569    Procedure: COLONOSCOPY WITH BIOPSY Diagnosis:       History of ulcerative colitis      (History of ulcerative colitis [Z87.19])    Surgeons: Mandy Stokes MD Responsible Provider: Diane Salazar MD    Anesthesia Type: general ASA Status: 3          Anesthesia Type: No value filed.     Alexander Phase I: Alexander Score: 10    Alexander Phase II: Alexander Score: 6      Anesthesia Post Evaluation    Comments: Postoperative Anesthesia Note    Name:    Rosalie Fagan  MRN:      [id number]    Patient Vitals in the past 12 hrs:  10/25/22 1431, BP:(!) 153/83, Pulse:59, Resp:16, SpO2:97 %  10/25/22 1419, BP:127/88, Pulse:78, Resp:16, SpO2:94 %  10/25/22 1412, BP:114/85, Pulse:83, Resp:16, SpO2:94 %  10/25/22 1258, BP:(!) 153/82, Temp:96.8 °F (36 °C), Pulse:66, Resp:16, SpO2:95 %     LABS:    CBC  Lab Results       Component                Value               Date/Time                  WBC                      10.7                09/03/2022 06:12 AM        HGB                      13.5                09/03/2022 06:12 AM        HCT                      39.2 (L)            09/03/2022 06:12 AM        PLT                      172                 09/03/2022 06:12 AM   RENAL  Lab Results       Component                Value               Date/Time                  NA                       142                 09/03/2022 04:49 AM        K                        3.8                 09/03/2022 04:49 AM        CL                       103                 09/03/2022 04:49 AM        CO2                      28                  09/03/2022 04:49 AM        BUN                      19                  09/03/2022 04:49 AM        CREATININE 0.7 (L)             09/03/2022 04:49 AM        GLUCOSE                  104 (H)             09/03/2022 04:49 AM   COAGS  No results found for: PROTIME, INR, APTT    Intake & Output:  @81MZSG@    Nausea & Vomiting:  No    Level of Consciousness:  Awake    Pain Assessment:  Adequate analgesia    Anesthesia Complications:  No apparent anesthetic complications    SUMMARY      Vital signs stable  OK to discharge from Stage I post anesthesia care.   Care transferred from Anesthesiology department on discharge from perioperative area

## 2022-10-26 LAB
C. DIFFICILE TOXIN MOLECULAR: ABNORMAL
ORGANISM: ABNORMAL

## 2022-11-30 ENCOUNTER — TELEPHONE (OUTPATIENT)
Dept: ENDOCRINOLOGY | Age: 77
End: 2022-11-30

## 2022-12-21 ENCOUNTER — HOSPITAL ENCOUNTER (EMERGENCY)
Age: 77
Discharge: HOME | End: 2022-12-21
Attending: EMERGENCY MEDICINE
Payer: MEDICARE

## 2022-12-21 ENCOUNTER — APPOINTMENT (OUTPATIENT)
Dept: CT IMAGING | Age: 77
End: 2022-12-21
Payer: MEDICARE

## 2022-12-21 VITALS
OXYGEN SATURATION: 96 % | TEMPERATURE: 98.3 F | RESPIRATION RATE: 16 BRPM | HEART RATE: 69 BPM | WEIGHT: 151.6 LBS | BODY MASS INDEX: 22.45 KG/M2 | HEIGHT: 69 IN | SYSTOLIC BLOOD PRESSURE: 146 MMHG | DIASTOLIC BLOOD PRESSURE: 77 MMHG

## 2022-12-21 DIAGNOSIS — K52.9 COLITIS: Primary | ICD-10-CM

## 2022-12-21 LAB
A/G RATIO: 1.2 (ref 1.1–2.2)
ALBUMIN SERPL-MCNC: 3.6 G/DL (ref 3.4–5)
ALP BLD-CCNC: 116 U/L (ref 40–129)
ALT SERPL-CCNC: 13 U/L (ref 10–40)
ANION GAP SERPL CALCULATED.3IONS-SCNC: 12 MMOL/L (ref 3–16)
AST SERPL-CCNC: 12 U/L (ref 15–37)
BASOPHILS ABSOLUTE: 0 K/UL (ref 0–0.2)
BASOPHILS RELATIVE PERCENT: 0.3 %
BILIRUB SERPL-MCNC: 0.9 MG/DL (ref 0–1)
BUN BLDV-MCNC: 16 MG/DL (ref 7–20)
CALCIUM SERPL-MCNC: 9.2 MG/DL (ref 8.3–10.6)
CHLORIDE BLD-SCNC: 95 MMOL/L (ref 99–110)
CO2: 31 MMOL/L (ref 21–32)
CREAT SERPL-MCNC: 1 MG/DL (ref 0.8–1.3)
EOSINOPHILS ABSOLUTE: 0.1 K/UL (ref 0–0.6)
EOSINOPHILS RELATIVE PERCENT: 1.1 %
GFR SERPL CREATININE-BSD FRML MDRD: >60 ML/MIN/{1.73_M2}
GLUCOSE BLD-MCNC: 110 MG/DL (ref 70–99)
HCT VFR BLD CALC: 42.9 % (ref 40.5–52.5)
HEMOGLOBIN: 14.8 G/DL (ref 13.5–17.5)
LYMPHOCYTES ABSOLUTE: 1.4 K/UL (ref 1–5.1)
LYMPHOCYTES RELATIVE PERCENT: 14.6 %
MAGNESIUM: 2.1 MG/DL (ref 1.8–2.4)
MCH RBC QN AUTO: 31.5 PG (ref 26–34)
MCHC RBC AUTO-ENTMCNC: 34.5 G/DL (ref 31–36)
MCV RBC AUTO: 91.2 FL (ref 80–100)
MONOCYTES ABSOLUTE: 0.6 K/UL (ref 0–1.3)
MONOCYTES RELATIVE PERCENT: 6.2 %
NEUTROPHILS ABSOLUTE: 7.4 K/UL (ref 1.7–7.7)
NEUTROPHILS RELATIVE PERCENT: 77.8 %
PDW BLD-RTO: 15.6 % (ref 12.4–15.4)
PLATELET # BLD: 214 K/UL (ref 135–450)
PMV BLD AUTO: 8.6 FL (ref 5–10.5)
POTASSIUM REFLEX MAGNESIUM: 3.1 MMOL/L (ref 3.5–5.1)
RBC # BLD: 4.7 M/UL (ref 4.2–5.9)
SODIUM BLD-SCNC: 138 MMOL/L (ref 136–145)
TOTAL PROTEIN: 6.5 G/DL (ref 6.4–8.2)
WBC # BLD: 9.6 K/UL (ref 4–11)

## 2022-12-21 PROCEDURE — 6370000000 HC RX 637 (ALT 250 FOR IP): Performed by: EMERGENCY MEDICINE

## 2022-12-21 PROCEDURE — 2580000003 HC RX 258: Performed by: EMERGENCY MEDICINE

## 2022-12-21 PROCEDURE — 80053 COMPREHEN METABOLIC PANEL: CPT

## 2022-12-21 PROCEDURE — 36415 COLL VENOUS BLD VENIPUNCTURE: CPT

## 2022-12-21 PROCEDURE — 74177 CT ABD & PELVIS W/CONTRAST: CPT

## 2022-12-21 PROCEDURE — 6360000004 HC RX CONTRAST MEDICATION: Performed by: EMERGENCY MEDICINE

## 2022-12-21 PROCEDURE — 99285 EMERGENCY DEPT VISIT HI MDM: CPT

## 2022-12-21 PROCEDURE — 85025 COMPLETE CBC W/AUTO DIFF WBC: CPT

## 2022-12-21 PROCEDURE — 83735 ASSAY OF MAGNESIUM: CPT

## 2022-12-21 RX ORDER — VANCOMYCIN HYDROCHLORIDE 125 MG/1
125 CAPSULE ORAL 4 TIMES DAILY
Qty: 40 CAPSULE | Refills: 0 | Status: SHIPPED | OUTPATIENT
Start: 2022-12-21 | End: 2022-12-31

## 2022-12-21 RX ORDER — 0.9 % SODIUM CHLORIDE 0.9 %
1000 INTRAVENOUS SOLUTION INTRAVENOUS ONCE
Status: COMPLETED | OUTPATIENT
Start: 2022-12-21 | End: 2022-12-21

## 2022-12-21 RX ORDER — POTASSIUM CHLORIDE 20 MEQ/1
40 TABLET, EXTENDED RELEASE ORAL ONCE
Status: COMPLETED | OUTPATIENT
Start: 2022-12-21 | End: 2022-12-21

## 2022-12-21 RX ORDER — METRONIDAZOLE 500 MG/1
500 TABLET ORAL ONCE
Status: DISCONTINUED | OUTPATIENT
Start: 2022-12-21 | End: 2022-12-21

## 2022-12-21 RX ADMIN — IOPAMIDOL 80 ML: 755 INJECTION, SOLUTION INTRAVENOUS at 12:41

## 2022-12-21 RX ADMIN — Medication 250 MG: at 15:33

## 2022-12-21 RX ADMIN — POTASSIUM CHLORIDE 40 MEQ: 1500 TABLET, EXTENDED RELEASE ORAL at 15:33

## 2022-12-21 RX ADMIN — SODIUM CHLORIDE 1000 ML: 9 INJECTION, SOLUTION INTRAVENOUS at 11:48

## 2022-12-21 ASSESSMENT — PAIN SCALES - GENERAL: PAINLEVEL_OUTOF10: 4

## 2022-12-21 ASSESSMENT — PAIN DESCRIPTION - ORIENTATION: ORIENTATION: LEFT;LOWER

## 2022-12-21 ASSESSMENT — LIFESTYLE VARIABLES: HOW OFTEN DO YOU HAVE A DRINK CONTAINING ALCOHOL: NEVER

## 2022-12-21 ASSESSMENT — PAIN DESCRIPTION - LOCATION: LOCATION: ABDOMEN;FLANK

## 2022-12-21 ASSESSMENT — PAIN - FUNCTIONAL ASSESSMENT: PAIN_FUNCTIONAL_ASSESSMENT: 0-10

## 2022-12-21 ASSESSMENT — PAIN DESCRIPTION - DESCRIPTORS: DESCRIPTORS: ACHING

## 2022-12-21 NOTE — ED PROVIDER NOTES
TABS TABLET    Take 6 mg by mouth nightly    METHYLPHENIDATE (RITALIN) 5 MG TABLET    Take 2.5 mg by mouth daily. NITROFURANTOIN, MACROCRYSTAL-MONOHYDRATE, (MACROBID) 100 MG CAPSULE        OLANZAPINE (ZYPREXA) 5 MG TABLET        OMEPRAZOLE (PRILOSEC) 20 MG DELAYED RELEASE CAPSULE        PREDNISONE (DELTASONE) 20 MG TABLET        SIMVASTATIN (ZOCOR) 20 MG TABLET        SIMVASTATIN (ZOCOR) 20 MG TABLET    Take 20 mg by mouth nightly    SIMVASTATIN (ZOCOR) 40 MG TABLET        SUPREP BOWEL PREP KIT 17.5-3.13-1.6 GM/177ML SOLN SOLUTION    MIX AND DRINK AS DIRECTED    TAMSULOSIN (FLOMAX) 0.4 MG CAPSULE        TAMSULOSIN (FLOMAX) 0.4 MG CAPSULE    Take 0.4 mg by mouth nightly    TRAZODONE (DESYREL) 50 MG TABLET        TRAZODONE (DESYREL) 50 MG TABLET    Take 50 mg by mouth At bedtime as needed    ZINC OXIDE 13 % CREA    Apply topically as needed       Allergies     He has No Known Allergies. Physical Exam     VITALS: BP (!) 146/77   Pulse 69   Temp 98.3 °F (36.8 °C) (Oral)   Resp 16   Ht 5' 9\" (1.753 m)   Wt 151 lb 9.6 oz (68.8 kg)   SpO2 96%   BMI 22.39 kg/m²    General: Well developed, well nourished male in no acute distress. HEENT: Sclera white, conjunctiva pink, mucous membranes tacky and oropharynx clear  Neck: Supple, no meningismus or JVD  Respirations: Unlabored with symmetric chest rise and fall  CV: Regular rate and rhythm with strong distal pulses  Abd: Mild left lower quadrant tenderness to palpation without rebound guarding distention or focal tenderness  Musculoskeletal: Moves all extremities with no signs of orthopedic trauma or edema. Skin: Warm and dry with no rashes or lesions. Neuro: Awake and alert with no focal neurologic deficits. Normal speech and facial symmetry. Psych: Mood and affect are normal.    Diagnostic Results       RADIOLOGY:  CT ABDOMEN PELVIS W IV CONTRAST Additional Contrast? None   Final Result      Mid and distal colitis.       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 Thoracic and lumbar age-indeterminate compression fractures. Gallstones. Small nonobstructing left renal calculus. Moderate prostatomegaly. LABS:   Results for orders placed or performed during the hospital encounter of 12/21/22   CBC with Auto Differential   Result Value Ref Range    WBC 9.6 4.0 - 11.0 K/uL    RBC 4.70 4.20 - 5.90 M/uL    Hemoglobin 14.8 13.5 - 17.5 g/dL    Hematocrit 42.9 40.5 - 52.5 %    MCV 91.2 80.0 - 100.0 fL    MCH 31.5 26.0 - 34.0 pg    MCHC 34.5 31.0 - 36.0 g/dL    RDW 15.6 (H) 12.4 - 15.4 %    Platelets 095 298 - 894 K/uL    MPV 8.6 5.0 - 10.5 fL    Neutrophils % 77.8 %    Lymphocytes % 14.6 %    Monocytes % 6.2 %    Eosinophils % 1.1 %    Basophils % 0.3 %    Neutrophils Absolute 7.4 1.7 - 7.7 K/uL    Lymphocytes Absolute 1.4 1.0 - 5.1 K/uL    Monocytes Absolute 0.6 0.0 - 1.3 K/uL    Eosinophils Absolute 0.1 0.0 - 0.6 K/uL    Basophils Absolute 0.0 0.0 - 0.2 K/uL   CMP w/ Reflex to MG   Result Value Ref Range    Sodium 138 136 - 145 mmol/L    Potassium reflex Magnesium 3.1 (L) 3.5 - 5.1 mmol/L    Chloride 95 (L) 99 - 110 mmol/L    CO2 31 21 - 32 mmol/L    Anion Gap 12 3 - 16    Glucose 110 (H) 70 - 99 mg/dL    BUN 16 7 - 20 mg/dL    Creatinine 1.0 0.8 - 1.3 mg/dL    Est, Glom Filt Rate >60 >60    Calcium 9.2 8.3 - 10.6 mg/dL    Total Protein 6.5 6.4 - 8.2 g/dL    Albumin 3.6 3.4 - 5.0 g/dL    Albumin/Globulin Ratio 1.2 1.1 - 2.2    Total Bilirubin 0.9 0.0 - 1.0 mg/dL    Alkaline Phosphatase 116 40 - 129 U/L    ALT 13 10 - 40 U/L    AST 12 (L) 15 - 37 U/L   Magnesium   Result Value Ref Range    Magnesium 2.10 1.80 - 2.40 mg/dL   --C. difficile sample was collected and sent    RECENT VITALS:  BP: (!) 146/77, Temp: 98.3 °F (36.8 °C), Heart Rate: 69, Resp: 16, SpO2: 96 %     ED Course     Nursing Notes, Past Medical Hx, Past Surgical Hx, Social Hx, Allergies, and Family Hx were reviewed.     The patient was given the following medications:  Orders Placed This Encounter   Medications    0.9 % sodium chloride bolus    iopamidol (ISOVUE-370) 76 % injection 80 mL    DISCONTD: metroNIDAZOLE (FLAGYL) tablet 500 mg     Order Specific Question:   Antimicrobial Indications     Answer:   Infectious Diarrhea    vancomycin (VANCOCIN) oral solution 250 mg     Order Specific Question:   Antimicrobial Indications     Answer:   Infectious Diarrhea    potassium chloride (KLOR-CON M) extended release tablet 40 mEq    vancomycin (VANCOCIN) 125 MG capsule     Sig: Take 1 capsule by mouth 4 times daily for 10 days     Dispense:  40 capsule     Refill:  0     He was seen and examined on arrival to the treatment area. Frequently reassessed and stable throughout his course. I consulted with clinical pharmacy for outpatient management of presumptive C. difficile colitis. First dose of vancomycin oral solution was given in the emergency department, and recommended capsule dose was prescribed. MEDICAL DECISION MAKING / ASSESSMENT / PLAN     Janiya Carlson is a 78 y.o. male presents to the emergency department with diarrhea and left lower quadrant pain. He is found to have colitis on his CT scan. While this may be inflammatory, I am also concerned that it is infectious. There is no blood in his diarrhea, which is somewhat reassuring from an ulcerative colitis flare. Vancomycin is currently recommended for treatment, therefore this is started. Discharge instructions include need for follow-up of this C. difficile test to determine how long his therapy should be considered. He is only mildly dehydrated. No problems with oral intake, and I feel he suitable for initial outpatient management. He also had mild hypokalemia without concurrent low magnesium. He was given oral potassium in the emergency department. This may be related to his diarrhea. He is not on any medications that obviously contribute to hypokalemia.     Clinical Impression     1.  Colitis        Disposition     PATIENT REFERRED TO:  [facility] Emergency Department  [address]  [phone]    As needed, If symptoms worsen    DISCHARGE MEDICATIONS:  Discharge Medication List as of 12/21/2022  5:40 PM        START taking these medications    Details   vancomycin (VANCOCIN) 125 MG capsule Take 1 capsule by mouth 4 times daily for 10 days, Disp-40 capsule, R-0Print             DISPOSITION Decision To Discharge 12/21/2022 07:13:11 PM          Sally Paredes MD  12/21/22 2012

## 2022-12-21 NOTE — DISCHARGE INSTRUCTIONS
His C. difficile study is currently pending. Please follow-up on this to see how long to continue therapy. Until that test returns, please take oral vancomycin as prescribed. Continue your other medications. Return for any new or worsening symptoms, any other urgent concerns. Please follow-up with your physicians.

## 2023-01-08 ENCOUNTER — HOSPITAL ENCOUNTER (EMERGENCY)
Age: 78
Discharge: NURSING, REHAB OR LONG-TERM CARE | End: 2023-01-08
Attending: STUDENT IN AN ORGANIZED HEALTH CARE EDUCATION/TRAINING PROGRAM
Payer: MEDICARE

## 2023-01-08 ENCOUNTER — APPOINTMENT (OUTPATIENT)
Dept: CT IMAGING | Age: 78
End: 2023-01-08
Payer: MEDICARE

## 2023-01-08 VITALS
HEART RATE: 55 BPM | BODY MASS INDEX: 24.03 KG/M2 | TEMPERATURE: 98.5 F | RESPIRATION RATE: 16 BRPM | OXYGEN SATURATION: 97 % | SYSTOLIC BLOOD PRESSURE: 123 MMHG | WEIGHT: 162.7 LBS | DIASTOLIC BLOOD PRESSURE: 68 MMHG

## 2023-01-08 DIAGNOSIS — M54.50 ACUTE LOW BACK PAIN, UNSPECIFIED BACK PAIN LATERALITY, UNSPECIFIED WHETHER SCIATICA PRESENT: ICD-10-CM

## 2023-01-08 DIAGNOSIS — N39.0 UTI (URINARY TRACT INFECTION), UNCOMPLICATED: Primary | ICD-10-CM

## 2023-01-08 LAB
ALBUMIN SERPL-MCNC: 3.3 G/DL (ref 3.4–5)
ALP BLD-CCNC: 197 U/L (ref 40–129)
ALT SERPL-CCNC: 42 U/L (ref 10–40)
ANION GAP SERPL CALCULATED.3IONS-SCNC: 13 MMOL/L (ref 3–16)
AST SERPL-CCNC: 25 U/L (ref 15–37)
BACTERIA: ABNORMAL /HPF
BASOPHILS ABSOLUTE: 0 K/UL (ref 0–0.2)
BASOPHILS RELATIVE PERCENT: 0.3 %
BILIRUB SERPL-MCNC: 0.4 MG/DL (ref 0–1)
BILIRUBIN DIRECT: <0.2 MG/DL (ref 0–0.3)
BILIRUBIN URINE: NEGATIVE
BILIRUBIN, INDIRECT: ABNORMAL MG/DL (ref 0–1)
BLOOD, URINE: ABNORMAL
BUN BLDV-MCNC: 24 MG/DL (ref 7–20)
CALCIUM SERPL-MCNC: 8.9 MG/DL (ref 8.3–10.6)
CHLORIDE BLD-SCNC: 99 MMOL/L (ref 99–110)
CLARITY: ABNORMAL
CO2: 27 MMOL/L (ref 21–32)
COLOR: YELLOW
CREAT SERPL-MCNC: 1 MG/DL (ref 0.8–1.3)
EOSINOPHILS ABSOLUTE: 0.1 K/UL (ref 0–0.6)
EOSINOPHILS RELATIVE PERCENT: 0.8 %
GFR SERPL CREATININE-BSD FRML MDRD: >60 ML/MIN/{1.73_M2}
GLUCOSE BLD-MCNC: 131 MG/DL (ref 70–99)
GLUCOSE URINE: NEGATIVE MG/DL
HCT VFR BLD CALC: 41.1 % (ref 40.5–52.5)
HEMOGLOBIN: 14.2 G/DL (ref 13.5–17.5)
KETONES, URINE: NEGATIVE MG/DL
LACTIC ACID: 2 MMOL/L (ref 0.4–2)
LEUKOCYTE ESTERASE, URINE: ABNORMAL
LYMPHOCYTES ABSOLUTE: 1.8 K/UL (ref 1–5.1)
LYMPHOCYTES RELATIVE PERCENT: 15.6 %
MCH RBC QN AUTO: 31.5 PG (ref 26–34)
MCHC RBC AUTO-ENTMCNC: 34.6 G/DL (ref 31–36)
MCV RBC AUTO: 91.1 FL (ref 80–100)
MICROSCOPIC EXAMINATION: YES
MONOCYTES ABSOLUTE: 0.6 K/UL (ref 0–1.3)
MONOCYTES RELATIVE PERCENT: 5.4 %
NEUTROPHILS ABSOLUTE: 9.1 K/UL (ref 1.7–7.7)
NEUTROPHILS RELATIVE PERCENT: 77.9 %
NITRITE, URINE: POSITIVE
PDW BLD-RTO: 15.3 % (ref 12.4–15.4)
PH UA: 5.5 (ref 5–8)
PLATELET # BLD: 239 K/UL (ref 135–450)
PMV BLD AUTO: 8.4 FL (ref 5–10.5)
POTASSIUM REFLEX MAGNESIUM: 4.1 MMOL/L (ref 3.5–5.1)
PROTEIN UA: NEGATIVE MG/DL
RBC # BLD: 4.51 M/UL (ref 4.2–5.9)
RBC UA: ABNORMAL /HPF (ref 0–4)
SODIUM BLD-SCNC: 139 MMOL/L (ref 136–145)
SPECIFIC GRAVITY UA: 1.02 (ref 1–1.03)
TOTAL PROTEIN: 6.2 G/DL (ref 6.4–8.2)
URINE REFLEX TO CULTURE: ABNORMAL
URINE TYPE: ABNORMAL
UROBILINOGEN, URINE: 0.2 E.U./DL
WBC # BLD: 11.7 K/UL (ref 4–11)
WBC UA: ABNORMAL /HPF (ref 0–5)

## 2023-01-08 PROCEDURE — 80076 HEPATIC FUNCTION PANEL: CPT

## 2023-01-08 PROCEDURE — 96375 TX/PRO/DX INJ NEW DRUG ADDON: CPT

## 2023-01-08 PROCEDURE — 2580000003 HC RX 258

## 2023-01-08 PROCEDURE — 6360000002 HC RX W HCPCS

## 2023-01-08 PROCEDURE — 81001 URINALYSIS AUTO W/SCOPE: CPT

## 2023-01-08 PROCEDURE — 36415 COLL VENOUS BLD VENIPUNCTURE: CPT

## 2023-01-08 PROCEDURE — 6360000004 HC RX CONTRAST MEDICATION

## 2023-01-08 PROCEDURE — 96365 THER/PROPH/DIAG IV INF INIT: CPT

## 2023-01-08 PROCEDURE — 83605 ASSAY OF LACTIC ACID: CPT

## 2023-01-08 PROCEDURE — 6370000000 HC RX 637 (ALT 250 FOR IP)

## 2023-01-08 PROCEDURE — 80048 BASIC METABOLIC PNL TOTAL CA: CPT

## 2023-01-08 PROCEDURE — 74177 CT ABD & PELVIS W/CONTRAST: CPT

## 2023-01-08 PROCEDURE — 85025 COMPLETE CBC W/AUTO DIFF WBC: CPT

## 2023-01-08 PROCEDURE — 99285 EMERGENCY DEPT VISIT HI MDM: CPT

## 2023-01-08 RX ORDER — LIDOCAINE 4 G/G
1 PATCH TOPICAL DAILY
Status: DISCONTINUED | OUTPATIENT
Start: 2023-01-08 | End: 2023-01-09 | Stop reason: HOSPADM

## 2023-01-08 RX ORDER — KETOROLAC TROMETHAMINE 30 MG/ML
15 INJECTION, SOLUTION INTRAMUSCULAR; INTRAVENOUS ONCE
Status: DISCONTINUED | OUTPATIENT
Start: 2023-01-08 | End: 2023-01-08

## 2023-01-08 RX ORDER — CEPHALEXIN 500 MG/1
500 CAPSULE ORAL 4 TIMES DAILY
Qty: 40 CAPSULE | Refills: 0 | Status: SHIPPED | OUTPATIENT
Start: 2023-01-08 | End: 2023-01-18

## 2023-01-08 RX ORDER — METHOCARBAMOL 500 MG/1
750 TABLET, FILM COATED ORAL ONCE
Status: COMPLETED | OUTPATIENT
Start: 2023-01-08 | End: 2023-01-08

## 2023-01-08 RX ORDER — ACETAMINOPHEN 500 MG
1000 TABLET ORAL
Status: COMPLETED | OUTPATIENT
Start: 2023-01-08 | End: 2023-01-08

## 2023-01-08 RX ADMIN — METHOCARBAMOL 750 MG: 500 TABLET ORAL at 18:51

## 2023-01-08 RX ADMIN — ACETAMINOPHEN 1000 MG: 500 TABLET ORAL at 18:51

## 2023-01-08 RX ADMIN — CEFTRIAXONE 1000 MG: 1 INJECTION, POWDER, FOR SOLUTION INTRAMUSCULAR; INTRAVENOUS at 21:17

## 2023-01-08 RX ADMIN — IOPAMIDOL 75 ML: 755 INJECTION, SOLUTION INTRAVENOUS at 20:09

## 2023-01-08 RX ADMIN — HYDROMORPHONE HYDROCHLORIDE 0.5 MG: 1 INJECTION, SOLUTION INTRAMUSCULAR; INTRAVENOUS; SUBCUTANEOUS at 19:47

## 2023-01-08 ASSESSMENT — PAIN - FUNCTIONAL ASSESSMENT
PAIN_FUNCTIONAL_ASSESSMENT: NONE - DENIES PAIN
PAIN_FUNCTIONAL_ASSESSMENT: 0-10

## 2023-01-08 ASSESSMENT — PAIN DESCRIPTION - DESCRIPTORS: DESCRIPTORS: ACHING

## 2023-01-08 ASSESSMENT — PAIN DESCRIPTION - LOCATION
LOCATION: BACK
LOCATION: BACK

## 2023-01-08 ASSESSMENT — LIFESTYLE VARIABLES: HOW OFTEN DO YOU HAVE A DRINK CONTAINING ALCOHOL: NEVER

## 2023-01-08 ASSESSMENT — PAIN DESCRIPTION - FREQUENCY: FREQUENCY: CONTINUOUS

## 2023-01-08 ASSESSMENT — PAIN DESCRIPTION - ORIENTATION
ORIENTATION: LOWER
ORIENTATION: LOWER

## 2023-01-08 ASSESSMENT — PAIN DESCRIPTION - ONSET: ONSET: ON-GOING

## 2023-01-08 ASSESSMENT — PAIN SCALES - GENERAL: PAINLEVEL_OUTOF10: 3

## 2023-01-08 NOTE — ED PROVIDER NOTES
[address] [facility] RESIDENT NOTE     Date of evaluation: 1/8/2023    ADDENDUM:      Care of this patient was assumed from Dr. Beatriz Ferreira. The patient was seen for Fatigue and Back Pain (Pt c/o back pain x 4 days and generalized weakness. Unable to ambulate at assisted living facility. )  . The patient's initial evaluation and plan have been discussed with the prior provider who initially evaluated the patient. Nursing Notes, Past Medical Hx, Past Surgical Hx, Social Hx, Allergies, and Family Hx were all reviewed. Diagnostic Results         RADIOLOGY:  CT ABDOMEN PELVIS W IV CONTRAST Additional Contrast? None   Final Result      1. No acute intra-abdominopelvic abnormality. 2. Cholelithiasis. 3. Left kidney 4 mm nonobstructing calculus. 3. Prostatomegaly. 4. Sigmoid diverticulosis. 5. Stable infrarenal abdominal aorta and bilateral common iliac artery fusiform aneurysms.           LABS:   Results for orders placed or performed during the hospital encounter of 01/08/23   CBC with Auto Differential   Result Value Ref Range    WBC 11.7 (H) 4.0 - 11.0 K/uL    RBC 4.51 4.20 - 5.90 M/uL    Hemoglobin 14.2 13.5 - 17.5 g/dL    Hematocrit 41.1 40.5 - 52.5 %    MCV 91.1 80.0 - 100.0 fL    MCH 31.5 26.0 - 34.0 pg    MCHC 34.6 31.0 - 36.0 g/dL    RDW 15.3 12.4 - 15.4 %    Platelets 324 447 - 254 K/uL    MPV 8.4 5.0 - 10.5 fL    Neutrophils % 77.9 %    Lymphocytes % 15.6 %    Monocytes % 5.4 %    Eosinophils % 0.8 %    Basophils % 0.3 %    Neutrophils Absolute 9.1 (H) 1.7 - 7.7 K/uL    Lymphocytes Absolute 1.8 1.0 - 5.1 K/uL    Monocytes Absolute 0.6 0.0 - 1.3 K/uL    Eosinophils Absolute 0.1 0.0 - 0.6 K/uL    Basophils Absolute 0.0 0.0 - 0.2 K/uL   BMP w/ Reflex to MG   Result Value Ref Range    Sodium 139 136 - 145 mmol/L    Potassium reflex Magnesium 4.1 3.5 - 5.1 mmol/L    Chloride 99 99 - 110 mmol/L    CO2 27 21 - 32 mmol/L    Anion Gap 13 3 - 16    Glucose 131 (H) 70 - 99 mg/dL    BUN 24 (H) 7 - 20 mg/dL    Creatinine 1.0 0.8 - 1.3 mg/dL    Est, Glom Filt Rate >60 >60    Calcium 8.9 8.3 - 10.6 mg/dL   Hepatic Function Panel   Result Value Ref Range    Total Protein 6.2 (L) 6.4 - 8.2 g/dL    Albumin 3.3 (L) 3.4 - 5.0 g/dL    Alkaline Phosphatase 197 (H) 40 - 129 U/L    ALT 42 (H) 10 - 40 U/L    AST 25 15 - 37 U/L    Total Bilirubin 0.4 0.0 - 1.0 mg/dL    Bilirubin, Direct <0.2 0.0 - 0.3 mg/dL    Bilirubin, Indirect see below 0.0 - 1.0 mg/dL   Lactic Acid   Result Value Ref Range    Lactic Acid 2.0 0.4 - 2.0 mmol/L   Urinalysis with Reflex to Culture    Specimen: Urine   Result Value Ref Range    Color, UA Yellow Straw/Yellow    Clarity, UA SL CLOUDY (A) Clear    Glucose, Ur Negative Negative mg/dL    Bilirubin Urine Negative Negative    Ketones, Urine Negative Negative mg/dL    Specific Gravity, UA 1.025 1.005 - 1.030    Blood, Urine SMALL (A) Negative    pH, UA 5.5 5.0 - 8.0    Protein, UA Negative Negative mg/dL    Urobilinogen, Urine 0.2 <2.0 E.U./dL    Nitrite, Urine POSITIVE (A) Negative    Leukocyte Esterase, Urine SMALL (A) Negative    Microscopic Examination YES     Urine Type Voided     Urine Reflex to Culture Not Indicated    Microscopic Urinalysis   Result Value Ref Range    WBC, UA 3-5 0 - 5 /HPF    RBC, UA 0-2 0 - 4 /HPF    Bacteria, UA 3+ (A) None Seen /HPF       RECENT VITALS:  BP: (!) 164/89, Temp: 98.5 °F (36.9 °C), Heart Rate: 56, Resp: 16, SpO2: 96 %     Procedures         ED Course     ED Course as of 01/08/23 2214   Isi Santana Jan 08, 2023 1923 Alk Phos(!): 197 [DR]   2044 I was informed by the CT tech that the patient was refusing his CT. I explained to the patient that we were concerned the patient had a AAA that would ultimately be life-threatening and require surgery. He continued to refuse, I offered the patient additional pain medication which was limiting factor to him getting the CT. Patient was amendable to this plan.   However the patient had a right hand IV that precluded him from getting a CT angio of the aorta. He refused a another IV. Thus, I continue with a CT abdomen pelvis with IV contrast to determine other intra-abdominal pathology, under the understanding that a AAA visualization was less than ideal. [DR]   2046 IMPRESSION:     1. No acute intra-abdominopelvic abnormality. 2. Cholelithiasis. 3. Left kidney 4 mm nonobstructing calculus. 3. Prostatomegaly. 4. Sigmoid diverticulosis. 5. Stable infrarenal abdominal aorta and bilateral common iliac artery fusiform aneurysms. Low suspicion for ruptured AAA at this time [DR]   2046 Patient's UA notable for infection, which might be contributing to the patient's low back pain. Also noted to have a WBC of 11.7. I will treat with antibiotics at this time []      ED Course User Index  [DR] Irma Castle MD       The patient was given the following medications:  Orders Placed This Encounter   Medications    methocarbamol (ROBAXIN) tablet 750 mg    DISCONTD: ketorolac (TORADOL) injection 15 mg    lidocaine 4 % external patch 1 patch    acetaminophen (TYLENOL) tablet 1,000 mg    HYDROmorphone (DILAUDID) injection 0.5 mg    iopamidol (ISOVUE-370) 76 % injection 75 mL    cefTRIAXone (ROCEPHIN) 1,000 mg in dextrose 5 % 50 mL IVPB mini-bag     Order Specific Question:   Antimicrobial Indications     Answer:   Urinary Tract Infection    cephALEXin (KEFLEX) 500 MG capsule     Sig: Take 1 capsule by mouth 4 times daily for 10 days     Dispense:  40 capsule     Refill:  0       CONSULTS:  None    MEDICAL DECISION MAKING / ASSESSMENT / Ursula Allen  Xavier Dennis is a 78 y.o. male with a past medical history of C. difficile infection, treated, AAA on previous imaging presenting to the emergency department with low back pain. At time of signout, patient was pending a CT abdomen pelvis and UA. Patient had a notable tender abdomen.   He was given Tylenol, lidocaine patch and Robaxin for the low back pain without relief. Initial considerations include a ruptured AAA given the abdominal pain and low back pain and prior history of AAA. Patient has been hemodynamically stable. CT abdomen pelvis had no intra-abdominal abnormality, lowering suspicion for AAA. The patient had a UA that was notable for nitrates, leukocytes and 3+ bacteria. Coupling this with the low back pain I suspect the patient might have pyelonephritis to which he will be treated outpatient. Problems Addressed:  Acute low back pain, unspecified back pain laterality, unspecified whether sciatica present: acute illness or injury  UTI (urinary tract infection), uncomplicated: acute illness or injury    Amount and/or Complexity of Data Reviewed  External Data Reviewed: labs. Details: Searchedn for prior urine cultures, but there are no previous. Thus pt given cecftriaxone and keflex OP. Labs: ordered. Decision-making details documented in ED Course. Radiology: ordered. Decision-making details documented in ED Course. Risk  OTC drugs. Prescription drug management. This patient was also evaluated by the attending physician. All care plans were discussed and agreed upon. Clinical Impression     1. UTI (urinary tract infection), uncomplicated    2. Acute low back pain, unspecified back pain laterality, unspecified whether sciatica present        Disposition     PATIENT REFERRED TO:  No follow-up provider specified.     DISCHARGE MEDICATIONS:  New Prescriptions    CEPHALEXIN (KEFLEX) 500 MG CAPSULE    Take 1 capsule by mouth 4 times daily for 10 days       DISPOSITION Discharge - Pending Orders Complete 01/08/2023 09:05:06 PM         Selena Cortes MD  Resident  01/08/23 3415

## 2023-01-08 NOTE — ED PROVIDER NOTES
ED Attending Attestation Note     Date of evaluation: 1/8/2023    This patient was seen by the resident. I have seen and examined the patient, agree with the workup, evaluation, management and diagnosis. The care plan has been discussed. My assessment reveals a chronically ill-appearing male who presents with atraumatic back pain for 3 to 4 days. He endorses pain \"at his belt line\" without radiation into his legs. On exam, he actually has guarding of his abdomen and is quite tender anteriorly. He is neurologically intact in his lower extremities and has normal pulses in his feet. He has no significant deficits or signs of cauda equina syndrome. 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 Due to that, we will obtain repeat CT scan to ensure he has no ruptured AAA. If this is normal, suspect his back pain is musculoskeletal in etiology and will treat appropriately.      Greer Nogueira MD  01/08/23 8413

## 2023-01-08 NOTE — ED PROVIDER NOTES
[id number] [facility] RESIDENT NOTE       Date of evaluation: 1/8/2023    Chief Complaint     Fatigue and Back Pain (Pt c/o back pain x 4 days and generalized weakness. Unable to ambulate at assisted living facility. )      History of Present Illness     Katiuska Benedict is a 78 y.o. male with a PMH significant for hypertension and C. difficile colitis, and a AAA who presents with back pain. Patient reports that for the past 5 days he has been having pain in his lower back that is 7 out of 8 in intensity and does not radiate. He denies any history of IVDU, has not had any saddle anesthesia, has not had any motor deficits or sensory changes. He denies any known trauma to the area. He also reports normal urinary and bowel habits without any bleeding. He denies any fever or chills, chest pain, shortness of breath, nausea or vomiting, or abdominal pain. MEDICAL DECISION MAKING / ASSESSMENT / PLAN     INITIAL VITALS: BP: (!) 155/94, Temp: 98 °F (36.7 °C), Heart Rate: 69, Resp: 16, SpO2: 95 %    Upon presentation the patient was well-appearing, afebrile, hemodynamically stable. There is low concern at this time for cauda equina syndrome given his physical examination and his history. No evidence or predisposing factors to raise concern for an epidural abscesses. Low concern for fracture given lack of trauma and minimal tender on exam.  His abdomen was tender and he has a history of AAA. Given this, we ordered a CT scan of his abdomen which is still pending at this time. We also provided pain relief with acetaminophen and methocarbamol and lidocaine patch. His CBC was notable for a mild leukocytosis to 11.7 with a largely unremarkable BMP and lactate. His hepatic function was notable for a mildly elevated alkaline phosphatase to 197.   At this time will be handing off service will be signing out care to oncoming provider who will be responsible for following up with a CT scan and determining appropriate disposition. Medical Decision Making  Amount and/or Complexity of Data Reviewed  Labs: ordered. Radiology: ordered. Risk  OTC drugs. Prescription drug management. This patient was also evaluated by the attending physician. All care plans werediscussed and agreed upon. Clinical Impression     Full evaluation still pending    Disposition     PATIENT REFERRED TO:  No follow-up provider specified.     DISCHARGE MEDICATIONS:  New Prescriptions    No medications on file       DISPOSITION          Diagnostic Results and Other Data     RADIOLOGY:  CT ABDOMEN PELVIS W IV CONTRAST Additional Contrast? None    (Results Pending)       LABS:   Results for orders placed or performed during the hospital encounter of 01/08/23   CBC with Auto Differential   Result Value Ref Range    WBC 11.7 (H) 4.0 - 11.0 K/uL    RBC 4.51 4.20 - 5.90 M/uL    Hemoglobin 14.2 13.5 - 17.5 g/dL    Hematocrit 41.1 40.5 - 52.5 %    MCV 91.1 80.0 - 100.0 fL    MCH 31.5 26.0 - 34.0 pg    MCHC 34.6 31.0 - 36.0 g/dL    RDW 15.3 12.4 - 15.4 %    Platelets 033 428 - 754 K/uL    MPV 8.4 5.0 - 10.5 fL    Neutrophils % 77.9 %    Lymphocytes % 15.6 %    Monocytes % 5.4 %    Eosinophils % 0.8 %    Basophils % 0.3 %    Neutrophils Absolute 9.1 (H) 1.7 - 7.7 K/uL    Lymphocytes Absolute 1.8 1.0 - 5.1 K/uL    Monocytes Absolute 0.6 0.0 - 1.3 K/uL    Eosinophils Absolute 0.1 0.0 - 0.6 K/uL    Basophils Absolute 0.0 0.0 - 0.2 K/uL   BMP w/ Reflex to MG   Result Value Ref Range    Sodium 139 136 - 145 mmol/L    Potassium reflex Magnesium 4.1 3.5 - 5.1 mmol/L    Chloride 99 99 - 110 mmol/L    CO2 27 21 - 32 mmol/L    Anion Gap 13 3 - 16    Glucose 131 (H) 70 - 99 mg/dL    BUN 24 (H) 7 - 20 mg/dL    Creatinine 1.0 0.8 - 1.3 mg/dL    Est, Glom Filt Rate >60 >60    Calcium 8.9 8.3 - 10.6 mg/dL   Hepatic Function Panel   Result Value Ref Range    Total Protein 6.2 (L) 6.4 - 8.2 g/dL    Albumin 3.3 (L) 3.4 - 5.0 g/dL Alkaline Phosphatase 197 (H) 40 - 129 U/L    ALT 42 (H) 10 - 40 U/L    AST 25 15 - 37 U/L    Total Bilirubin 0.4 0.0 - 1.0 mg/dL    Bilirubin, Direct <0.2 0.0 - 0.3 mg/dL    Bilirubin, Indirect see below 0.0 - 1.0 mg/dL   Lactic Acid   Result Value Ref Range    Lactic Acid 2.0 0.4 - 2.0 mmol/L     RECENT VITALS:  BP: (!) 155/94, Temp: 98 °F (36.7 °C), Heart Rate: 69,Resp: 16, SpO2: 95 %       ED Course     Nursing Notes, Past Medical Hx, Past Surgical Hx, Social Hx, Allergies, and Family Hx were reviewed. The patient was given the following medications:  Orders Placed This Encounter   Medications    methocarbamol (ROBAXIN) tablet 750 mg    DISCONTD: ketorolac (TORADOL) injection 15 mg    lidocaine 4 % external patch 1 patch    acetaminophen (TYLENOL) tablet 1,000 mg       CONSULTS:  None    Review of Systems     Review of systems was performed and is otherwise negative except as noted in HPI    Past Medical, Surgical, Family, and Social History     He has a past medical history of Hypertension and Poor memory. He has a past surgical history that includes Appendectomy; Nasal septum surgery; Coronary stent placement (2007); and Colonoscopy (N/A, 10/25/2022). His family history includes Cancer in his father; No Known Problems in his mother. He reports that he has never smoked. He has never used smokeless tobacco. He reports that he does not currently use alcohol. He reports that he does not currently use drugs.     Medications     Previous Medications    AMMONIUM LACTATE (AMLACTIN) 12 % CREAM        ASPIRIN 81 MG EC TABLET    Take 81 mg by mouth daily    ASPIRIN LOW DOSE 81 MG EC TABLET    TAKE 1 TABLET BY MOUTH EVERY DAY    FINASTERIDE (PROSCAR) 5 MG TABLET        FINASTERIDE (PROSCAR) 5 MG TABLET    Take 5 mg by mouth daily    LOSARTAN (COZAAR) 25 MG TABLET        LOSARTAN (COZAAR) 50 MG TABLET    Take 50 mg by mouth daily    MELATONIN 3 MG TABS TABLET    TAKE 1 TABLET BY MOUTH EVERY NIGHT AT BEDTIME MELATONIN 3 MG TABS TABLET    Take 6 mg by mouth nightly    METHYLPHENIDATE (RITALIN) 5 MG TABLET    Take 2.5 mg by mouth daily. NITROFURANTOIN, MACROCRYSTAL-MONOHYDRATE, (MACROBID) 100 MG CAPSULE        OLANZAPINE (ZYPREXA) 5 MG TABLET        OMEPRAZOLE (PRILOSEC) 20 MG DELAYED RELEASE CAPSULE        PREDNISONE (DELTASONE) 20 MG TABLET        SIMVASTATIN (ZOCOR) 20 MG TABLET        SIMVASTATIN (ZOCOR) 20 MG TABLET    Take 20 mg by mouth nightly    SIMVASTATIN (ZOCOR) 40 MG TABLET        SUPREP BOWEL PREP KIT 17.5-3.13-1.6 GM/177ML SOLN SOLUTION    MIX AND DRINK AS DIRECTED    TAMSULOSIN (FLOMAX) 0.4 MG CAPSULE        TAMSULOSIN (FLOMAX) 0.4 MG CAPSULE    Take 0.4 mg by mouth nightly    TRAZODONE (DESYREL) 50 MG TABLET        TRAZODONE (DESYREL) 50 MG TABLET    Take 50 mg by mouth At bedtime as needed    ZINC OXIDE 13 % CREA    Apply topically as needed       Allergies     He has No Known Allergies. Physical Exam     INITIAL VITALS: BP: (!) 155/94, Temp: 98 °F (36.7 °C), Heart Rate: 69, Resp: 16, SpO2: 95 %   Physical Exam  Constitutional:       Appearance: Normal appearance. HENT:      Head: Normocephalic and atraumatic. Nose: Nose normal.      Mouth/Throat:      Mouth: Mucous membranes are moist.   Eyes:      Extraocular Movements: Extraocular movements intact. Cardiovascular:      Rate and Rhythm: Normal rate. Pulmonary:      Effort: Pulmonary effort is normal.   Abdominal:      General: Abdomen is flat. Tenderness: There is abdominal tenderness. Musculoskeletal:      Cervical back: Normal range of motion. Comments: Tenderness to palpation in his lower back bilaterally   Skin:     General: Skin is warm. Capillary Refill: Capillary refill takes less than 2 seconds. Neurological:      Mental Status: He is alert and oriented to person, place, and time. Mental status is at baseline. Psychiatric:         Behavior: Behavior normal.         Thought Content:  Thought content normal.             Trae Varma MD  01/08/23 1625

## 2023-01-09 NOTE — DISCHARGE INSTRUCTIONS
In the emergency department for your back pain. Your labs demonstrated an infection in your urine, I am giving you some antibiotics for the next 10 days which she should complete. Return to the emergency department if you have any fevers, develop severe nausea and vomiting or diarrhea, or anything else that may worry you.

## 2023-02-01 PROBLEM — M81.0 OSTEOPOROSIS: Status: ACTIVE | Noted: 2023-02-01

## 2023-02-01 PROBLEM — E43 SEVERE PROTEIN-CALORIE MALNUTRITION (HCC): Status: ACTIVE | Noted: 2021-11-27

## 2023-02-01 PROBLEM — R53.1 WEAKNESS GENERALIZED: Status: ACTIVE | Noted: 2022-01-30

## 2023-02-01 PROBLEM — K50.90 REGIONAL ENTERITIS (HCC): Status: ACTIVE | Noted: 2022-03-30

## 2023-02-01 PROBLEM — K57.92 DIVERTICULITIS: Status: ACTIVE | Noted: 2021-11-25

## 2023-02-01 PROBLEM — E11.9 DIABETES (HCC): Status: ACTIVE | Noted: 2023-02-01

## 2023-02-01 PROBLEM — F32.A DEPRESSION: Status: ACTIVE | Noted: 2022-04-01

## 2023-02-06 ENCOUNTER — OFFICE VISIT (OUTPATIENT)
Dept: ENDOCRINOLOGY | Age: 78
End: 2023-02-06
Payer: MEDICARE

## 2023-02-06 VITALS
HEART RATE: 109 BPM | HEIGHT: 69 IN | OXYGEN SATURATION: 99 % | DIASTOLIC BLOOD PRESSURE: 90 MMHG | SYSTOLIC BLOOD PRESSURE: 150 MMHG | WEIGHT: 145.4 LBS | BODY MASS INDEX: 21.53 KG/M2

## 2023-02-06 DIAGNOSIS — T38.0X5A ADRENAL INSUFFICIENCY DUE TO CORTICOSTEROID WITHDRAWAL (HCC): ICD-10-CM

## 2023-02-06 DIAGNOSIS — E55.9 VITAMIN D DEFICIENCY: ICD-10-CM

## 2023-02-06 DIAGNOSIS — M81.0 OSTEOPOROSIS, UNSPECIFIED OSTEOPOROSIS TYPE, UNSPECIFIED PATHOLOGICAL FRACTURE PRESENCE: ICD-10-CM

## 2023-02-06 DIAGNOSIS — M54.9 BACK PAIN, UNSPECIFIED BACK LOCATION, UNSPECIFIED BACK PAIN LATERALITY, UNSPECIFIED CHRONICITY: ICD-10-CM

## 2023-02-06 DIAGNOSIS — E27.3 ADRENAL INSUFFICIENCY DUE TO CORTICOSTEROID WITHDRAWAL (HCC): ICD-10-CM

## 2023-02-06 DIAGNOSIS — R41.3 MEMORY DIFFICULTIES: ICD-10-CM

## 2023-02-06 DIAGNOSIS — Z79.52 CURRENT CHRONIC USE OF SYSTEMIC STEROIDS: Primary | ICD-10-CM

## 2023-02-06 PROBLEM — M54.12 CERVICAL NEURITIS: Status: ACTIVE | Noted: 2023-02-06

## 2023-02-06 PROBLEM — R60.9 FLUID RETENTION: Status: ACTIVE | Noted: 2023-02-06

## 2023-02-06 PROCEDURE — 99204 OFFICE O/P NEW MOD 45 MIN: CPT | Performed by: INTERNAL MEDICINE

## 2023-02-06 PROCEDURE — 1123F ACP DISCUSS/DSCN MKR DOCD: CPT | Performed by: INTERNAL MEDICINE

## 2023-02-06 RX ORDER — DULOXETIN HYDROCHLORIDE 20 MG/1
20 CAPSULE, DELAYED RELEASE ORAL DAILY
COMMUNITY
Start: 2023-01-20

## 2023-02-06 RX ORDER — EZETIMIBE 10 MG/1
10 TABLET ORAL DAILY
COMMUNITY
Start: 2022-04-12 | End: 2023-02-06

## 2023-02-06 RX ORDER — PREDNISONE 10 MG/1
TABLET ORAL
Qty: 60 TABLET | Refills: 1 | Status: SHIPPED | OUTPATIENT
Start: 2023-02-06

## 2023-02-06 RX ORDER — ESCITALOPRAM OXALATE 20 MG/1
20 TABLET ORAL DAILY
COMMUNITY
Start: 2022-04-12

## 2023-02-06 NOTE — PROGRESS NOTES
Patient ID:   Alexa Hauser is a 78 y.o. male     Chief Complaint:   Alexa Hauser presents for an evaluation of Adrenal insufficiency. Lives in senior living care   Here with son, Ewelina Samuel [employer] [facility])   Subjective:   Patient is on steroids since started early 2022. HE was in a care facility in [place]. He has IBS, UC/Crohn's. He follows with [address] In Nov 2022, he was on prednisone to 40 mg daily   Currently on Prednisone 30 mg daily for Inflammatory bowel disease      He has bruises for last 6 months. Lost 25 lbs in last 2 months due to frequent bowel movements. No diarrhea. He is fatigued  He has memory issues. Appetite is fine. Has occasional dizziness   Occasional nausea  He has back pain   Denies salt craving, arthralgias. No sleep disturbances      Supplements: The following portions of the patient's history were reviewed and updated as appropriate:       Family History   Problem Relation Age of Onset    No Known Problems Mother     Cancer Father           Social History     Socioeconomic History    Marital status:       Spouse name: Not on file    Number of children: Not on file    Years of education: Not on file    Highest education level: Not on file   Occupational History    Not on file   Tobacco Use    Smoking status: Never    Smokeless tobacco: Never   Vaping Use    Vaping Use: Never used   Substance and Sexual Activity    Alcohol use: Not Currently    Drug use: Not Currently    Sexual activity: Not Currently   Other Topics Concern    Not on file   Social History Narrative    Not on file     Social Determinants of Health     Financial Resource Strain: Not on file   Food Insecurity: Not on file   Transportation Needs: Not on file   Physical Activity: Not on file   Stress: Not on file   Social Connections: Not on file   Intimate Partner Violence: Not on file   Housing Stability: Not on file        Past Medical History:   Diagnosis Date    Cervical neuritis 2/6/2023    Fluid retention 2/6/2023    Hypertension     Memory difficulties 2/6/2023    Poor memory         Past Surgical History:   Procedure Laterality Date    APPENDECTOMY      COLONOSCOPY N/A 10/25/2022    COLONOSCOPY WITH BIOPSY performed by Ruddy Cordero MD at [address] [place] [address]    NASAL SEPTUM SURGERY          No Known Allergies       Current Outpatient Medications:     Nutritional Supplements (BOOST PO), Take by mouth in the morning and at bedtime, Disp: , Rfl:     DULoxetine (CYMBALTA) 20 MG extended release capsule, Take 20 mg by mouth daily, Disp: , Rfl:     escitalopram (LEXAPRO) 20 MG tablet, Take 20 mg by mouth daily, Disp: , Rfl:     magnesium hydroxide (MILK OF MAGNESIA) 400 MG/5ML suspension, Take by mouth daily as needed for Constipation, Disp: , Rfl:     predniSONE (DELTASONE) 10 MG tablet, 20 mg daily for two weeks and then 15 mg daily, Disp: 60 tablet, Rfl: 1    ASPIRIN LOW DOSE 81 MG EC tablet, TAKE 1 TABLET BY MOUTH EVERY DAY, Disp: , Rfl:     aspirin 81 MG EC tablet, Take 81 mg by mouth daily, Disp: , Rfl:     finasteride (PROSCAR) 5 MG tablet, Take 5 mg by mouth daily, Disp: , Rfl:     losartan (COZAAR) 50 MG tablet, Take 50 mg by mouth daily, Disp: , Rfl:     melatonin 3 MG TABS tablet, 6 mg nightly as needed, Disp: , Rfl:     OLANZapine (ZYPREXA) 5 MG tablet, Take 5 mg by mouth every 6 hours as needed, Disp: , Rfl:     omeprazole (PRILOSEC) 20 MG delayed release capsule, Take 20 mg by mouth Daily, Disp: , Rfl:     simvastatin (ZOCOR) 40 MG tablet, Take 40 mg by mouth nightly, Disp: , Rfl:     tamsulosin (FLOMAX) 0.4 MG capsule, Take 0.4 mg by mouth nightly, Disp: , Rfl:     traZODone (DESYREL) 50 MG tablet, Take 50 mg by mouth At bedtime as needed, Disp: , Rfl:      Review of Systems:  Constitutional: Negative for fever, chills. HENT: Negative for congestion, ear pain, rhinorrhea,  sore throat and trouble swallowing.    Eyes: Negative for photophobia, redness, itching. Respiratory: Negative for cough, shortness of breath and sputum. Cardiovascular: Negative for chest pain, palpitations and leg swelling. Gastrointestinal: Negative for nausea, vomiting, abdominal pain, diarrhea, constipation. Endocrine: Negative for cold intolerance, heat intolerance, polydipsia, polyphagia and polyuria. Genitourinary: Negative for dysuria, urgency, frequency, hematuria and flank pain. Musculoskeletal: Negative for myalgias,   neck pain. Skin/Nail: Negative for rash, itching. Normal nails. Neurological: Negative for seizures, weakness, numbness and headaches. Hematological/ Lymph nodes: Negative for adenopathy. Does not bruise/bleed easily. Psychiatric/Behavioral: Negative for suicidal ideas, anxiety. Depression since wife passed away Jan 2022. Objective:   Physical Exam:  BP (!) 130/90 (Site: Right Upper Arm, Position: Sitting, Cuff Size: Medium Adult)   Pulse (!) 109   Ht 5' 9\" (1.753 m)   Wt 145 lb 6.4 oz (66 kg)   SpO2 99%   BMI 21.47 kg/m²    Constitutional: Patient is oriented to person, place, and time. Patient appears well-developed and well-nourished. HENT:              Head: Normocephalic and atraumatic. Eyes: Conjunctivae and EOM are normal.               Neck: Normal range of motion. Thyroid normal.   Cardiovascular: Normal rate, regular rhythm and normal heart sounds. Pulmonary/Chest: Effort normal and breath sounds normal.   Musculoskeletal: Normal range of motion. Neurological: Patient is alert and oriented to person, place, and time. Patient has normal reflexes. Skin: Skin is warm and dry. Has  a lot of bruises. Psychiatric: Patient has a normal mood and affect.  Patient behavior is normal.     Lab Review:    Admission on 01/08/2023, Discharged on 01/08/2023   Component Date Value Ref Range Status    WBC 01/08/2023 11.7 (A)  4.0 - 11.0 K/uL Final    RBC 01/08/2023 4.51  4.20 - 5.90 M/uL Final    Hemoglobin 01/08/2023 14.2  13.5 - 17.5 g/dL Final    Hematocrit 01/08/2023 41.1  40.5 - 52.5 % Final    MCV 01/08/2023 91.1  80.0 - 100.0 fL Final    MCH 01/08/2023 31.5  26.0 - 34.0 pg Final    MCHC 01/08/2023 34.6  31.0 - 36.0 g/dL Final    RDW 01/08/2023 15.3  12.4 - 15.4 % Final    Platelets 27/45/4939 239  135 - 450 K/uL Final    MPV 01/08/2023 8.4  5.0 - 10.5 fL Final    Neutrophils % 01/08/2023 77.9  % Final    Lymphocytes % 01/08/2023 15.6  % Final    Monocytes % 01/08/2023 5.4  % Final    Eosinophils % 01/08/2023 0.8  % Final    Basophils % 01/08/2023 0.3  % Final    Neutrophils Absolute 01/08/2023 9.1 (A)  1.7 - 7.7 K/uL Final    Lymphocytes Absolute 01/08/2023 1.8  1.0 - 5.1 K/uL Final    Monocytes Absolute 01/08/2023 0.6  0.0 - 1.3 K/uL Final    Eosinophils Absolute 01/08/2023 0.1  0.0 - 0.6 K/uL Final    Basophils Absolute 01/08/2023 0.0  0.0 - 0.2 K/uL Final    Sodium 01/08/2023 139  136 - 145 mmol/L Final    Potassium reflex Magnesium 01/08/2023 4.1  3.5 - 5.1 mmol/L Final    Chloride 01/08/2023 99  99 - 110 mmol/L Final    CO2 01/08/2023 27  21 - 32 mmol/L Final    Anion Gap 01/08/2023 13  3 - 16 Final    Glucose 01/08/2023 131 (A)  70 - 99 mg/dL Final    BUN 01/08/2023 24 (A)  7 - 20 mg/dL Final    Creatinine 01/08/2023 1.0  0.8 - 1.3 mg/dL Final    Est, Glom Filt Rate 01/08/2023 >60  >60 Final    Calcium 01/08/2023 8.9  8.3 - 10.6 mg/dL Final    Total Protein 01/08/2023 6.2 (A)  6.4 - 8.2 g/dL Final    Albumin 01/08/2023 3.3 (A)  3.4 - 5.0 g/dL Final    Alkaline Phosphatase 01/08/2023 197 (A)  40 - 129 U/L Final    ALT 01/08/2023 42 (A)  10 - 40 U/L Final    AST 01/08/2023 25  15 - 37 U/L Final    Total Bilirubin 01/08/2023 0.4  0.0 - 1.0 mg/dL Final    Bilirubin, Direct 01/08/2023 <0.2  0.0 - 0.3 mg/dL Final    Bilirubin, Indirect 01/08/2023 see below  0.0 - 1.0 mg/dL Final    Lactic Acid 01/08/2023 2.0  0.4 - 2.0 mmol/L Final    Color, UA 01/08/2023 Yellow  Straw/Yellow Final    Clarity, UA 01/08/2023 SL CLOUDY (A)  Clear Final    Glucose, Ur 01/08/2023 Negative  Negative mg/dL Final    Bilirubin Urine 01/08/2023 Negative  Negative Final    Ketones, Urine 01/08/2023 Negative  Negative mg/dL Final    Specific Gravity, UA 01/08/2023 1.025  1.005 - 1.030 Final    Blood, Urine 01/08/2023 SMALL (A)  Negative Final    pH, UA 01/08/2023 5.5  5.0 - 8.0 Final    Protein, UA 01/08/2023 Negative  Negative mg/dL Final    Urobilinogen, Urine 01/08/2023 0.2  <2.0 E.U./dL Final    Nitrite, Urine 01/08/2023 POSITIVE (A)  Negative Final    Leukocyte Esterase, Urine 01/08/2023 SMALL (A)  Negative Final    Microscopic Examination 01/08/2023 YES   Final    Urine Type 01/08/2023 Voided   Final    Urine Reflex to Culture 01/08/2023 Not Indicated   Final    WBC, UA 01/08/2023 3-5  0 - 5 /HPF Final    RBC, UA 01/08/2023 0-2  0 - 4 /HPF Final    Bacteria, UA 01/08/2023 3+ (A)  None Seen /HPF Final   Admission on 12/21/2022, Discharged on 12/21/2022   Component Date Value Ref Range Status    WBC 12/21/2022 9.6  4.0 - 11.0 K/uL Final    RBC 12/21/2022 4.70  4.20 - 5.90 M/uL Final    Hemoglobin 12/21/2022 14.8  13.5 - 17.5 g/dL Final    Hematocrit 12/21/2022 42.9  40.5 - 52.5 % Final    MCV 12/21/2022 91.2  80.0 - 100.0 fL Final    MCH 12/21/2022 31.5  26.0 - 34.0 pg Final    MCHC 12/21/2022 34.5  31.0 - 36.0 g/dL Final    RDW 12/21/2022 15.6 (A)  12.4 - 15.4 % Final    Platelets 77/24/8240 214  135 - 450 K/uL Final    MPV 12/21/2022 8.6  5.0 - 10.5 fL Final    Neutrophils % 12/21/2022 77.8  % Final    Lymphocytes % 12/21/2022 14.6  % Final    Monocytes % 12/21/2022 6.2  % Final    Eosinophils % 12/21/2022 1.1  % Final    Basophils % 12/21/2022 0.3  % Final    Neutrophils Absolute 12/21/2022 7.4  1.7 - 7.7 K/uL Final    Lymphocytes Absolute 12/21/2022 1.4  1.0 - 5.1 K/uL Final    Monocytes Absolute 12/21/2022 0.6  0.0 - 1.3 K/uL Final    Eosinophils Absolute 12/21/2022 0.1  0.0 - 0.6 K/uL Final    Basophils Absolute 12/21/2022 0.0  0.0 - 0.2 K/uL Final    Sodium 12/21/2022 138  136 - 145 mmol/L Final    Potassium reflex Magnesium 12/21/2022 3.1 (A)  3.5 - 5.1 mmol/L Final    Chloride 12/21/2022 95 (A)  99 - 110 mmol/L Final    CO2 12/21/2022 31  21 - 32 mmol/L Final    Anion Gap 12/21/2022 12  3 - 16 Final    Glucose 12/21/2022 110 (A)  70 - 99 mg/dL Final    BUN 12/21/2022 16  7 - 20 mg/dL Final    Creatinine 12/21/2022 1.0  0.8 - 1.3 mg/dL Final    Est, Glom Filt Rate 12/21/2022 >60  >60 Final    Calcium 12/21/2022 9.2  8.3 - 10.6 mg/dL Final    Total Protein 12/21/2022 6.5  6.4 - 8.2 g/dL Final    Albumin 12/21/2022 3.6  3.4 - 5.0 g/dL Final    Albumin/Globulin Ratio 12/21/2022 1.2  1.1 - 2.2 Final    Total Bilirubin 12/21/2022 0.9  0.0 - 1.0 mg/dL Final    Alkaline Phosphatase 12/21/2022 116  40 - 129 U/L Final    ALT 12/21/2022 13  10 - 40 U/L Final    AST 12/21/2022 12 (A)  15 - 37 U/L Final    Magnesium 12/21/2022 2.10  1.80 - 2.40 mg/dL Final   Admission on 10/25/2022, Discharged on 10/25/2022   Component Date Value Ref Range Status    C.diff Toxin/Antigen 10/25/2022    Final                    Value: Indeterminate see results of C. difficile amplification(PCR)  Normal Range: Negative      Organism 10/25/2022 C. difficile toxin B gene detected (A)   Final    C. difficile toxin Molecular 10/25/2022  (A)   Final                    Value:POSITIVE FOR  Normal Range: Not detected  CONTACT PRECAUTIONS INDICATED     Admission on 09/03/2022, Discharged on 09/03/2022   Component Date Value Ref Range Status    Ventricular Rate 09/03/2022 53  BPM Final    Atrial Rate 09/03/2022 53  BPM Final    P-R Interval 09/03/2022 132  ms Final    QRS Duration 09/03/2022 96  ms Final    Q-T Interval 09/03/2022 484  ms Final    QTc Calculation (Bazett) 09/03/2022 454  ms Final    P Axis 09/03/2022 16  degrees Final    R Axis 09/03/2022 -14  degrees Final    T Axis 09/03/2022 34  degrees Final    Diagnosis 09/03/2022 EKG performed in ER and to be interpreted by ER physician. Confirmed by MD, ER ([id number]),  Israel Jurado ([id number]) on 9/3/2022 7:48:03 AM   Final    Sodium 09/03/2022 142  136 - 145 mmol/L Final    Potassium reflex Magnesium 09/03/2022 3.8  3.5 - 5.1 mmol/L Final    Chloride 09/03/2022 103  99 - 110 mmol/L Final    CO2 09/03/2022 28  21 - 32 mmol/L Final    Anion Gap 09/03/2022 11  3 - 16 Final    Glucose 09/03/2022 104 (A)  70 - 99 mg/dL Final    BUN 09/03/2022 19  7 - 20 mg/dL Final    Creatinine 09/03/2022 0.7 (A)  0.8 - 1.3 mg/dL Final    GFR Non- 09/03/2022 >60  >60 Final    GFR  09/03/2022 >60  >60 Final    Calcium 09/03/2022 8.8  8.3 - 10.6 mg/dL Final    Pro-BNP 09/03/2022 252  0 - 449 pg/mL Final    Troponin 09/03/2022 <0.01  <0.01 ng/mL Final    pH, Sree 09/03/2022 7.448  7.350 - 7.450 Final    pCO2, Sree 09/03/2022 43.9  41.0 - 51.0 mmHg Final    pO2, Sree 09/03/2022 60.1 (A)  25.0 - 40.0 mmHg Final    HCO3, Venous 09/03/2022 30.3 (A)  24.0 - 28.0 mmol/L Final    Base Excess, Sree 09/03/2022 5.6 (A)  -2.0 - 3.0 mmol/L Final    O2 Sat, Sree 09/03/2022 92  Not established % Final    Carboxyhemoglobin 09/03/2022 0.8  0.0 - 1.5 % Final    MetHgb, Sree 09/03/2022 0.1  0.0 - 1.5 % Final    TC02 (Calc), Sree 09/03/2022 32  mmol/L Final    Hemoglobin, Sree, Reduced 09/03/2022 8.40  % Final    Lactic Acid 09/03/2022 1.6  0.4 - 2.0 mmol/L Final    Rejected Test 09/03/2022 CBCWD   Final    Reason for Rejection 09/03/2022 see below   Final    WBC 09/03/2022 10.7  4.0 - 11.0 K/uL Final    RBC 09/03/2022 4.30  4.20 - 5.90 M/uL Final    Hemoglobin 09/03/2022 13.5  13.5 - 17.5 g/dL Final    Hematocrit 09/03/2022 39.2 (A)  40.5 - 52.5 % Final    MCV 09/03/2022 91.2  80.0 - 100.0 fL Final    MCH 09/03/2022 31.3  26.0 - 34.0 pg Final    MCHC 09/03/2022 34.3  31.0 - 36.0 g/dL Final    RDW 09/03/2022 15.3  12.4 - 15.4 % Final    Platelets 68/71/6257 172  135 - 450 K/uL Final    MPV 09/03/2022 8.4  5.0 - 10.5 fL Final    Neutrophils % 09/03/2022 71.5 % Final    Lymphocytes % 09/03/2022 20.9  % Final    Monocytes % 09/03/2022 6.3  % Final    Eosinophils % 09/03/2022 0.8  % Final    Basophils % 09/03/2022 0.5  % Final    Neutrophils Absolute 09/03/2022 7.6  1.7 - 7.7 K/uL Final    Lymphocytes Absolute 09/03/2022 2.2  1.0 - 5.1 K/uL Final    Monocytes Absolute 09/03/2022 0.7  0.0 - 1.3 K/uL Final    Eosinophils Absolute 09/03/2022 0.1  0.0 - 0.6 K/uL Final    Basophils Absolute 09/03/2022 0.1  0.0 - 0.2 K/uL Final    Troponin 09/03/2022 <0.01  <0.01 ng/mL Final        Assessment and Plan     Momo Fairchild was seen today for new patient. Diagnoses and all orders for this visit:    Current chronic use of systemic steroids  -     DEXA BONE DENSITY AXIAL SKELETON; Future  -     Vitamin D 25 Hydroxy; Future  -     Comprehensive Metabolic Panel; Future  -     TSH; Future  -     T4, Free; Future  -     predniSONE (DELTASONE) 10 MG tablet; 20 mg daily for two weeks and then 15 mg daily    Osteoporosis, unspecified osteoporosis type, unspecified pathological fracture presence  -     XR LUMBAR SPINE (2-3 VIEWS); Future  -     DEXA BONE DENSITY AXIAL SKELETON; Future  -     Vitamin D 25 Hydroxy; Future  -     Comprehensive Metabolic Panel; Future  -     TSH; Future  -     T4, Free; Future    Memory difficulties  -     Vitamin D 25 Hydroxy; Future  -     Comprehensive Metabolic Panel; Future  -     TSH; Future  -     T4, Free; Future    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 (HCC)  -     Vitamin D 25 Hydroxy; Future  -     Comprehensive Metabolic Panel; Future  -     TSH; Future  -     T4, Free; Future    Vitamin D deficiency  -     Vitamin D 25 Hydroxy; Future  -     Comprehensive Metabolic Panel; Future  -     TSH; Future  -     T4, Free; Future    Back pain, unspecified back location, unspecified back pain laterality, unspecified chronicity  -     XR THORACIC SPINE (2 VIEWS); Future  -     Vitamin D 25 Hydroxy;  Future  -     Comprehensive Metabolic Panel; Future  -     TSH; Future  -     T4, Free; Future         1: Adrenal insufficiency due to chronic steroids use     He is on supra physiological doses     Cutting down steroids is possible. It can exacerbate underlying disease or IBD   If that happens he will have to go back and see GI physician    Will manage titration only     Cut down Prednisone to 20 mg daily for two weeks   After two weeks cut down steroids to 15 mg daily     Adrenal insufficiency education. Reviewed adrenal insufficiency precautions. If patient has a fever or significant illness they are to triple their dose of steroids for three days. If illness has resolved, they can resume their previous dose. If the patient has an illness where they are unable to keep down oral intake, take hydrocortisone shot or got ER. Get a medic Alert bracelet that states 'Adrenal Insufficiency.     2: HTN   Cut down Losartan to 25 mg daily and stop in 2 weeks when prednisone is down to 15 mg daily     3: Backpain   X rays       4: Secondary osteoporosis   Will do DXA scan     If osteoporosis then set up for Reclast as he has OBD, absorption will be poor     5: Vit D def   Check     RTC in 4 weeks with CMP, TFTs , Vit D     Electronically signed by Henry Campbell MD on 2/6/2023 at 10:46 AM

## 2023-02-06 NOTE — PATIENT INSTRUCTIONS
Cut down Prednisone to 20 mg daily for two weeks   After two weeks cut down steroids to 15 mg daily     Cut down Losartan to 25 mg daily and stop in 2 weeks when prednisone is down to 15 mg daily

## 2023-04-18 ENCOUNTER — APPOINTMENT (OUTPATIENT)
Dept: CT IMAGING | Age: 78
DRG: 690 | End: 2023-04-18
Payer: MEDICARE

## 2023-04-18 ENCOUNTER — APPOINTMENT (OUTPATIENT)
Dept: GENERAL RADIOLOGY | Age: 78
DRG: 690 | End: 2023-04-18
Payer: MEDICARE

## 2023-04-18 ENCOUNTER — HOSPITAL ENCOUNTER (INPATIENT)
Age: 78
LOS: 5 days | Discharge: HOME | DRG: 690 | End: 2023-04-24
Attending: STUDENT IN AN ORGANIZED HEALTH CARE EDUCATION/TRAINING PROGRAM | Admitting: HOSPITALIST
Payer: MEDICARE

## 2023-04-18 DIAGNOSIS — N39.0 COMPLICATED UTI (URINARY TRACT INFECTION): ICD-10-CM

## 2023-04-18 DIAGNOSIS — K52.9 ENTERITIS: ICD-10-CM

## 2023-04-18 DIAGNOSIS — N40.0 ENLARGED PROSTATE: Primary | ICD-10-CM

## 2023-04-18 PROBLEM — E87.1 HYPONATREMIA: Status: ACTIVE | Noted: 2023-04-18

## 2023-04-18 PROBLEM — E78.5 HLD (HYPERLIPIDEMIA): Status: ACTIVE | Noted: 2023-04-18

## 2023-04-18 PROBLEM — I10 UNCONTROLLED HYPERTENSION: Status: ACTIVE | Noted: 2023-04-18

## 2023-04-18 PROBLEM — N40.1 BPH WITH OBSTRUCTION/LOWER URINARY TRACT SYMPTOMS: Status: ACTIVE | Noted: 2023-04-18

## 2023-04-18 PROBLEM — T38.0X5A: Status: ACTIVE | Noted: 2023-04-18

## 2023-04-18 PROBLEM — F03.90 DEMENTIA (HCC): Status: ACTIVE | Noted: 2023-04-18

## 2023-04-18 PROBLEM — N13.8 BPH WITH OBSTRUCTION/LOWER URINARY TRACT SYMPTOMS: Status: ACTIVE | Noted: 2023-04-18

## 2023-04-18 PROBLEM — E09.9: Status: ACTIVE | Noted: 2023-04-18

## 2023-04-18 LAB
ALBUMIN SERPL-MCNC: 3.4 G/DL (ref 3.4–5)
ALBUMIN/GLOB SERPL: 1.1 {RATIO} (ref 1.1–2.2)
ALP SERPL-CCNC: 94 U/L (ref 40–129)
ALT SERPL-CCNC: 9 U/L (ref 10–40)
ANION GAP SERPL CALCULATED.3IONS-SCNC: 14 MMOL/L (ref 3–16)
AST SERPL-CCNC: 15 U/L (ref 15–37)
BACTERIA URNS QL MICRO: ABNORMAL /HPF
BASOPHILS # BLD: 0 K/UL (ref 0–0.2)
BASOPHILS NFR BLD: 0.1 %
BILIRUB SERPL-MCNC: 0.8 MG/DL (ref 0–1)
BILIRUB UR QL STRIP.AUTO: NEGATIVE
BUN SERPL-MCNC: 24 MG/DL (ref 7–20)
CALCIUM SERPL-MCNC: 8.7 MG/DL (ref 8.3–10.6)
CHLORIDE SERPL-SCNC: 93 MMOL/L (ref 99–110)
CLARITY UR: ABNORMAL
CO2 SERPL-SCNC: 25 MMOL/L (ref 21–32)
COLOR UR: YELLOW
CREAT SERPL-MCNC: 1 MG/DL (ref 0.8–1.3)
DEPRECATED RDW RBC AUTO: 15.8 % (ref 12.4–15.4)
EKG ATRIAL RATE: 69 BPM
EKG DIAGNOSIS: NORMAL
EKG P AXIS: -12 DEGREES
EKG P-R INTERVAL: 136 MS
EKG Q-T INTERVAL: 440 MS
EKG QRS DURATION: 100 MS
EKG QTC CALCULATION (BAZETT): 471 MS
EKG R AXIS: -24 DEGREES
EKG T AXIS: 9 DEGREES
EKG VENTRICULAR RATE: 69 BPM
EOSINOPHIL # BLD: 0 K/UL (ref 0–0.6)
EOSINOPHIL NFR BLD: 0 %
EPI CELLS #/AREA URNS HPF: ABNORMAL /HPF (ref 0–5)
GFR SERPLBLD CREATININE-BSD FMLA CKD-EPI: >60 ML/MIN/{1.73_M2}
GLUCOSE SERPL-MCNC: 168 MG/DL (ref 70–99)
GLUCOSE UR STRIP.AUTO-MCNC: NEGATIVE MG/DL
HCT VFR BLD AUTO: 38.4 % (ref 40.5–52.5)
HGB BLD-MCNC: 13.1 G/DL (ref 13.5–17.5)
HGB UR QL STRIP.AUTO: ABNORMAL
KETONES UR STRIP.AUTO-MCNC: 15 MG/DL
LEUKOCYTE ESTERASE UR QL STRIP.AUTO: ABNORMAL
LIPASE SERPL-CCNC: 16 U/L (ref 13–60)
LYMPHOCYTES # BLD: 0.5 K/UL (ref 1–5.1)
LYMPHOCYTES NFR BLD: 4.6 %
MCH RBC QN AUTO: 31.1 PG (ref 26–34)
MCHC RBC AUTO-ENTMCNC: 34.2 G/DL (ref 31–36)
MCV RBC AUTO: 90.9 FL (ref 80–100)
MONOCYTES # BLD: 0.4 K/UL (ref 0–1.3)
MONOCYTES NFR BLD: 3.6 %
NEUTROPHILS # BLD: 10.1 K/UL (ref 1.7–7.7)
NEUTROPHILS NFR BLD: 91.7 %
NITRITE UR QL STRIP.AUTO: POSITIVE
PH UR STRIP.AUTO: 6 [PH] (ref 5–8)
PLATELET # BLD AUTO: 175 K/UL (ref 135–450)
PMV BLD AUTO: 9.3 FL (ref 5–10.5)
POTASSIUM SERPL-SCNC: 4.2 MMOL/L (ref 3.5–5.1)
PROT SERPL-MCNC: 6.4 G/DL (ref 6.4–8.2)
PROT UR STRIP.AUTO-MCNC: 30 MG/DL
RBC # BLD AUTO: 4.22 M/UL (ref 4.2–5.9)
RBC #/AREA URNS HPF: ABNORMAL /HPF (ref 0–4)
SODIUM SERPL-SCNC: 132 MMOL/L (ref 136–145)
SP GR UR STRIP.AUTO: 1.01 (ref 1–1.03)
TROPONIN, HIGH SENSITIVITY: 23 NG/L (ref 0–22)
TROPONIN, HIGH SENSITIVITY: 26 NG/L (ref 0–22)
UA COMPLETE W REFLEX CULTURE PNL UR: YES
UA DIPSTICK W REFLEX MICRO PNL UR: YES
URN SPEC COLLECT METH UR: ABNORMAL
UROBILINOGEN UR STRIP-ACNC: 0.2 E.U./DL
WBC # BLD AUTO: 11 K/UL (ref 4–11)
WBC #/AREA URNS HPF: ABNORMAL /HPF (ref 0–5)

## 2023-04-18 PROCEDURE — 71045 X-RAY EXAM CHEST 1 VIEW: CPT

## 2023-04-18 PROCEDURE — 80053 COMPREHEN METABOLIC PANEL: CPT

## 2023-04-18 PROCEDURE — 85025 COMPLETE CBC W/AUTO DIFF WBC: CPT

## 2023-04-18 PROCEDURE — 2580000003 HC RX 258: Performed by: STUDENT IN AN ORGANIZED HEALTH CARE EDUCATION/TRAINING PROGRAM

## 2023-04-18 PROCEDURE — 2580000003 HC RX 258: Performed by: EMERGENCY MEDICINE

## 2023-04-18 PROCEDURE — 87086 URINE CULTURE/COLONY COUNT: CPT

## 2023-04-18 PROCEDURE — 93005 ELECTROCARDIOGRAM TRACING: CPT | Performed by: STUDENT IN AN ORGANIZED HEALTH CARE EDUCATION/TRAINING PROGRAM

## 2023-04-18 PROCEDURE — 6370000000 HC RX 637 (ALT 250 FOR IP): Performed by: EMERGENCY MEDICINE

## 2023-04-18 PROCEDURE — 87077 CULTURE AEROBIC IDENTIFY: CPT

## 2023-04-18 PROCEDURE — G0378 HOSPITAL OBSERVATION PER HR: HCPCS

## 2023-04-18 PROCEDURE — 2580000003 HC RX 258: Performed by: INTERNAL MEDICINE

## 2023-04-18 PROCEDURE — 99285 EMERGENCY DEPT VISIT HI MDM: CPT

## 2023-04-18 PROCEDURE — 36415 COLL VENOUS BLD VENIPUNCTURE: CPT

## 2023-04-18 PROCEDURE — 81001 URINALYSIS AUTO W/SCOPE: CPT

## 2023-04-18 PROCEDURE — 84484 ASSAY OF TROPONIN QUANT: CPT

## 2023-04-18 PROCEDURE — 6370000000 HC RX 637 (ALT 250 FOR IP): Performed by: STUDENT IN AN ORGANIZED HEALTH CARE EDUCATION/TRAINING PROGRAM

## 2023-04-18 PROCEDURE — 83690 ASSAY OF LIPASE: CPT

## 2023-04-18 PROCEDURE — 6360000004 HC RX CONTRAST MEDICATION: Performed by: STUDENT IN AN ORGANIZED HEALTH CARE EDUCATION/TRAINING PROGRAM

## 2023-04-18 PROCEDURE — 74177 CT ABD & PELVIS W/CONTRAST: CPT

## 2023-04-18 PROCEDURE — 6360000002 HC RX W HCPCS: Performed by: EMERGENCY MEDICINE

## 2023-04-18 PROCEDURE — 87186 SC STD MICRODIL/AGAR DIL: CPT

## 2023-04-18 RX ORDER — ACETAMINOPHEN 325 MG/1
650 TABLET ORAL EVERY 6 HOURS PRN
Status: DISCONTINUED | OUTPATIENT
Start: 2023-04-18 | End: 2023-04-24 | Stop reason: HOSPADM

## 2023-04-18 RX ORDER — ATORVASTATIN CALCIUM 20 MG/1
20 TABLET, FILM COATED ORAL DAILY
Status: DISCONTINUED | OUTPATIENT
Start: 2023-04-18 | End: 2023-04-24 | Stop reason: HOSPADM

## 2023-04-18 RX ORDER — LANOLIN ALCOHOL/MO/W.PET/CERES
6 CREAM (GRAM) TOPICAL NIGHTLY PRN
Status: DISCONTINUED | OUTPATIENT
Start: 2023-04-18 | End: 2023-04-24 | Stop reason: HOSPADM

## 2023-04-18 RX ORDER — ONDANSETRON 4 MG/1
4 TABLET, ORALLY DISINTEGRATING ORAL EVERY 8 HOURS PRN
Status: DISCONTINUED | OUTPATIENT
Start: 2023-04-18 | End: 2023-04-24 | Stop reason: HOSPADM

## 2023-04-18 RX ORDER — LOSARTAN POTASSIUM 50 MG/1
50 TABLET ORAL DAILY
Status: DISCONTINUED | OUTPATIENT
Start: 2023-04-18 | End: 2023-04-24 | Stop reason: HOSPADM

## 2023-04-18 RX ORDER — ASPIRIN 81 MG/1
81 TABLET ORAL DAILY
Status: DISCONTINUED | OUTPATIENT
Start: 2023-04-19 | End: 2023-04-24 | Stop reason: HOSPADM

## 2023-04-18 RX ORDER — FINASTERIDE 5 MG/1
5 TABLET, FILM COATED ORAL DAILY
Status: DISCONTINUED | OUTPATIENT
Start: 2023-04-19 | End: 2023-04-24 | Stop reason: HOSPADM

## 2023-04-18 RX ORDER — ENOXAPARIN SODIUM 100 MG/ML
40 INJECTION SUBCUTANEOUS DAILY
Status: DISCONTINUED | OUTPATIENT
Start: 2023-04-18 | End: 2023-04-24 | Stop reason: HOSPADM

## 2023-04-18 RX ORDER — SODIUM CHLORIDE, SODIUM LACTATE, POTASSIUM CHLORIDE, AND CALCIUM CHLORIDE .6; .31; .03; .02 G/100ML; G/100ML; G/100ML; G/100ML
1000 INJECTION, SOLUTION INTRAVENOUS ONCE
Status: COMPLETED | OUTPATIENT
Start: 2023-04-18 | End: 2023-04-18

## 2023-04-18 RX ORDER — ACETAMINOPHEN 650 MG/1
650 SUPPOSITORY RECTAL EVERY 6 HOURS PRN
Status: DISCONTINUED | OUTPATIENT
Start: 2023-04-18 | End: 2023-04-24 | Stop reason: HOSPADM

## 2023-04-18 RX ORDER — ACETAMINOPHEN 500 MG
1000 TABLET ORAL ONCE
Status: COMPLETED | OUTPATIENT
Start: 2023-04-18 | End: 2023-04-18

## 2023-04-18 RX ORDER — OLANZAPINE 10 MG/1
5 TABLET ORAL EVERY 6 HOURS PRN
Status: DISCONTINUED | OUTPATIENT
Start: 2023-04-18 | End: 2023-04-24 | Stop reason: HOSPADM

## 2023-04-18 RX ORDER — ONDANSETRON 2 MG/ML
4 INJECTION INTRAMUSCULAR; INTRAVENOUS EVERY 6 HOURS PRN
Status: DISCONTINUED | OUTPATIENT
Start: 2023-04-18 | End: 2023-04-24 | Stop reason: HOSPADM

## 2023-04-18 RX ORDER — AMMONIUM LACTATE 12 G/100G
CREAM TOPICAL PRN
COMMUNITY

## 2023-04-18 RX ORDER — TAMSULOSIN HYDROCHLORIDE 0.4 MG/1
0.8 CAPSULE ORAL NIGHTLY
Status: DISCONTINUED | OUTPATIENT
Start: 2023-04-18 | End: 2023-04-24 | Stop reason: HOSPADM

## 2023-04-18 RX ORDER — SODIUM CHLORIDE 9 MG/ML
INJECTION, SOLUTION INTRAVENOUS PRN
Status: DISCONTINUED | OUTPATIENT
Start: 2023-04-18 | End: 2023-04-24 | Stop reason: HOSPADM

## 2023-04-18 RX ORDER — SODIUM CHLORIDE 0.9 % (FLUSH) 0.9 %
5-40 SYRINGE (ML) INJECTION PRN
Status: DISCONTINUED | OUTPATIENT
Start: 2023-04-18 | End: 2023-04-24 | Stop reason: HOSPADM

## 2023-04-18 RX ORDER — SODIUM CHLORIDE 9 MG/ML
INJECTION, SOLUTION INTRAVENOUS CONTINUOUS
Status: ACTIVE | OUTPATIENT
Start: 2023-04-18 | End: 2023-04-19

## 2023-04-18 RX ORDER — ACETAMINOPHEN 325 MG/1
650 TABLET ORAL EVERY 6 HOURS PRN
COMMUNITY

## 2023-04-18 RX ORDER — LIDOCAINE HYDROCHLORIDE 20 MG/ML
JELLY TOPICAL ONCE
Status: COMPLETED | OUTPATIENT
Start: 2023-04-18 | End: 2023-04-18

## 2023-04-18 RX ORDER — PANTOPRAZOLE SODIUM 40 MG/1
40 TABLET, DELAYED RELEASE ORAL
Status: DISCONTINUED | OUTPATIENT
Start: 2023-04-19 | End: 2023-04-24 | Stop reason: HOSPADM

## 2023-04-18 RX ORDER — DULOXETIN HYDROCHLORIDE 20 MG/1
20 CAPSULE, DELAYED RELEASE ORAL DAILY
Status: DISCONTINUED | OUTPATIENT
Start: 2023-04-19 | End: 2023-04-24

## 2023-04-18 RX ORDER — METHOCARBAMOL 500 MG/1
500 TABLET, FILM COATED ORAL 3 TIMES DAILY PRN
COMMUNITY

## 2023-04-18 RX ORDER — TRAZODONE HYDROCHLORIDE 50 MG/1
50 TABLET ORAL NIGHTLY PRN
Status: DISCONTINUED | OUTPATIENT
Start: 2023-04-18 | End: 2023-04-24 | Stop reason: HOSPADM

## 2023-04-18 RX ORDER — SODIUM CHLORIDE 0.9 % (FLUSH) 0.9 %
5-40 SYRINGE (ML) INJECTION EVERY 12 HOURS SCHEDULED
Status: DISCONTINUED | OUTPATIENT
Start: 2023-04-18 | End: 2023-04-24 | Stop reason: HOSPADM

## 2023-04-18 RX ORDER — POLYETHYLENE GLYCOL 3350 17 G/17G
17 POWDER, FOR SOLUTION ORAL DAILY PRN
Status: DISCONTINUED | OUTPATIENT
Start: 2023-04-18 | End: 2023-04-24 | Stop reason: HOSPADM

## 2023-04-18 RX ORDER — SODIUM CHLORIDE, SODIUM LACTATE, POTASSIUM CHLORIDE, AND CALCIUM CHLORIDE .6; .31; .03; .02 G/100ML; G/100ML; G/100ML; G/100ML
500 INJECTION, SOLUTION INTRAVENOUS ONCE
Status: DISCONTINUED | OUTPATIENT
Start: 2023-04-18 | End: 2023-04-24 | Stop reason: HOSPADM

## 2023-04-18 RX ADMIN — SODIUM CHLORIDE, POTASSIUM CHLORIDE, SODIUM LACTATE AND CALCIUM CHLORIDE 1000 ML: 600; 310; 30; 20 INJECTION, SOLUTION INTRAVENOUS at 16:11

## 2023-04-18 RX ADMIN — LIDOCAINE HYDROCHLORIDE: 20 JELLY TOPICAL at 16:10

## 2023-04-18 RX ADMIN — SODIUM CHLORIDE: 9 INJECTION, SOLUTION INTRAVENOUS at 22:10

## 2023-04-18 RX ADMIN — ACETAMINOPHEN 1000 MG: 500 TABLET ORAL at 19:14

## 2023-04-18 RX ADMIN — CEFTRIAXONE 1000 MG: 1 INJECTION, POWDER, FOR SOLUTION INTRAMUSCULAR; INTRAVENOUS at 19:28

## 2023-04-18 RX ADMIN — IOPAMIDOL 75 ML: 755 INJECTION, SOLUTION INTRAVENOUS at 15:04

## 2023-04-18 ASSESSMENT — PAIN DESCRIPTION - DESCRIPTORS
DESCRIPTORS: THROBBING
DESCRIPTORS: ACHING

## 2023-04-18 ASSESSMENT — PAIN SCALES - GENERAL
PAINLEVEL_OUTOF10: 5
PAINLEVEL_OUTOF10: 2

## 2023-04-18 ASSESSMENT — PAIN DESCRIPTION - ORIENTATION: ORIENTATION: MID

## 2023-04-18 ASSESSMENT — ENCOUNTER SYMPTOMS
CHEST TIGHTNESS: 0
SHORTNESS OF BREATH: 0
ABDOMINAL PAIN: 1

## 2023-04-18 ASSESSMENT — PAIN - FUNCTIONAL ASSESSMENT
PAIN_FUNCTIONAL_ASSESSMENT: NONE - DENIES PAIN
PAIN_FUNCTIONAL_ASSESSMENT: ACTIVITIES ARE NOT PREVENTED

## 2023-04-18 ASSESSMENT — PAIN DESCRIPTION - LOCATION
LOCATION: HEAD
LOCATION: HEAD

## 2023-04-18 ASSESSMENT — PAIN DESCRIPTION - PAIN TYPE: TYPE: ACUTE PAIN

## 2023-04-18 ASSESSMENT — PAIN DESCRIPTION - ONSET: ONSET: ON-GOING

## 2023-04-18 ASSESSMENT — PAIN DESCRIPTION - FREQUENCY: FREQUENCY: INTERMITTENT

## 2023-04-19 LAB
ANION GAP SERPL CALCULATED.3IONS-SCNC: 11 MMOL/L (ref 3–16)
BASOPHILS # BLD: 0 K/UL (ref 0–0.2)
BASOPHILS NFR BLD: 0.2 %
BUN SERPL-MCNC: 21 MG/DL (ref 7–20)
CALCIUM SERPL-MCNC: 8.6 MG/DL (ref 8.3–10.6)
CHLORIDE SERPL-SCNC: 96 MMOL/L (ref 99–110)
CO2 SERPL-SCNC: 26 MMOL/L (ref 21–32)
CREAT SERPL-MCNC: 0.8 MG/DL (ref 0.8–1.3)
DEPRECATED RDW RBC AUTO: 15.2 % (ref 12.4–15.4)
EOSINOPHIL # BLD: 0.1 K/UL (ref 0–0.6)
EOSINOPHIL NFR BLD: 0.8 %
GFR SERPLBLD CREATININE-BSD FMLA CKD-EPI: >60 ML/MIN/{1.73_M2}
GLUCOSE SERPL-MCNC: 73 MG/DL (ref 70–99)
HCT VFR BLD AUTO: 37.1 % (ref 40.5–52.5)
HGB BLD-MCNC: 12.6 G/DL (ref 13.5–17.5)
LACTATE BLDV-SCNC: 1.1 MMOL/L (ref 0.4–2)
LYMPHOCYTES # BLD: 0.9 K/UL (ref 1–5.1)
LYMPHOCYTES NFR BLD: 9.5 %
MAGNESIUM SERPL-MCNC: 1.9 MG/DL (ref 1.8–2.4)
MCH RBC QN AUTO: 31 PG (ref 26–34)
MCHC RBC AUTO-ENTMCNC: 33.9 G/DL (ref 31–36)
MCV RBC AUTO: 91.4 FL (ref 80–100)
MONOCYTES # BLD: 0.7 K/UL (ref 0–1.3)
MONOCYTES NFR BLD: 6.9 %
NEUTROPHILS # BLD: 8 K/UL (ref 1.7–7.7)
NEUTROPHILS NFR BLD: 82.6 %
OSMOLALITY SERPL: 287 MOSM/KG (ref 278–305)
OSMOLALITY UR: 491 MOSM/KG (ref 390–1070)
PLATELET # BLD AUTO: 155 K/UL (ref 135–450)
PMV BLD AUTO: 9.4 FL (ref 5–10.5)
POTASSIUM SERPL-SCNC: 3.5 MMOL/L (ref 3.5–5.1)
RBC # BLD AUTO: 4.06 M/UL (ref 4.2–5.9)
SODIUM SERPL-SCNC: 133 MMOL/L (ref 136–145)
WBC # BLD AUTO: 9.7 K/UL (ref 4–11)

## 2023-04-19 PROCEDURE — 97535 SELF CARE MNGMENT TRAINING: CPT

## 2023-04-19 PROCEDURE — 6370000000 HC RX 637 (ALT 250 FOR IP): Performed by: INTERNAL MEDICINE

## 2023-04-19 PROCEDURE — 85025 COMPLETE CBC W/AUTO DIFF WBC: CPT

## 2023-04-19 PROCEDURE — 2580000003 HC RX 258: Performed by: INTERNAL MEDICINE

## 2023-04-19 PROCEDURE — 83605 ASSAY OF LACTIC ACID: CPT

## 2023-04-19 PROCEDURE — 83930 ASSAY OF BLOOD OSMOLALITY: CPT

## 2023-04-19 PROCEDURE — 36415 COLL VENOUS BLD VENIPUNCTURE: CPT

## 2023-04-19 PROCEDURE — 6360000002 HC RX W HCPCS: Performed by: INTERNAL MEDICINE

## 2023-04-19 PROCEDURE — 80048 BASIC METABOLIC PNL TOTAL CA: CPT

## 2023-04-19 PROCEDURE — 97116 GAIT TRAINING THERAPY: CPT

## 2023-04-19 PROCEDURE — 83935 ASSAY OF URINE OSMOLALITY: CPT

## 2023-04-19 PROCEDURE — 1200000000 HC SEMI PRIVATE

## 2023-04-19 PROCEDURE — 99221 1ST HOSP IP/OBS SF/LOW 40: CPT | Performed by: NURSE PRACTITIONER

## 2023-04-19 PROCEDURE — 97530 THERAPEUTIC ACTIVITIES: CPT

## 2023-04-19 PROCEDURE — 97166 OT EVAL MOD COMPLEX 45 MIN: CPT

## 2023-04-19 PROCEDURE — 84153 ASSAY OF PSA TOTAL: CPT

## 2023-04-19 PROCEDURE — 83735 ASSAY OF MAGNESIUM: CPT

## 2023-04-19 PROCEDURE — 6370000000 HC RX 637 (ALT 250 FOR IP): Performed by: HOSPITALIST

## 2023-04-19 PROCEDURE — 6360000002 HC RX W HCPCS: Performed by: HOSPITALIST

## 2023-04-19 PROCEDURE — 97162 PT EVAL MOD COMPLEX 30 MIN: CPT

## 2023-04-19 RX ORDER — METRONIDAZOLE 500 MG/1
500 TABLET ORAL EVERY 8 HOURS SCHEDULED
Status: DISCONTINUED | OUTPATIENT
Start: 2023-04-19 | End: 2023-04-24 | Stop reason: HOSPADM

## 2023-04-19 RX ORDER — KETOROLAC TROMETHAMINE 30 MG/ML
15 INJECTION, SOLUTION INTRAMUSCULAR; INTRAVENOUS ONCE
Status: COMPLETED | OUTPATIENT
Start: 2023-04-19 | End: 2023-04-19

## 2023-04-19 RX ORDER — DICYCLOMINE HCL 20 MG
10 TABLET ORAL 4 TIMES DAILY PRN
Status: DISCONTINUED | OUTPATIENT
Start: 2023-04-19 | End: 2023-04-24 | Stop reason: HOSPADM

## 2023-04-19 RX ORDER — ONDANSETRON 4 MG/1
4 TABLET, FILM COATED ORAL 4 TIMES DAILY PRN
COMMUNITY

## 2023-04-19 RX ORDER — DIAPER,BRIEF,INFANT-TODD,DISP
EACH MISCELLANEOUS 2 TIMES DAILY
COMMUNITY

## 2023-04-19 RX ADMIN — FINASTERIDE 5 MG: 5 TABLET, FILM COATED ORAL at 11:43

## 2023-04-19 RX ADMIN — DICYCLOMINE HYDROCHLORIDE 10 MG: 20 TABLET ORAL at 17:06

## 2023-04-19 RX ADMIN — METRONIDAZOLE 500 MG: 500 TABLET ORAL at 04:42

## 2023-04-19 RX ADMIN — TRAZODONE HYDROCHLORIDE 50 MG: 50 TABLET ORAL at 21:05

## 2023-04-19 RX ADMIN — ASPIRIN 81 MG: 81 TABLET, COATED ORAL at 09:53

## 2023-04-19 RX ADMIN — METRONIDAZOLE 500 MG: 500 TABLET ORAL at 13:15

## 2023-04-19 RX ADMIN — ONDANSETRON 4 MG: 2 INJECTION INTRAMUSCULAR; INTRAVENOUS at 17:05

## 2023-04-19 RX ADMIN — ACETAMINOPHEN 650 MG: 325 TABLET ORAL at 09:51

## 2023-04-19 RX ADMIN — SODIUM CHLORIDE, PRESERVATIVE FREE 10 ML: 5 INJECTION INTRAVENOUS at 09:53

## 2023-04-19 RX ADMIN — LIDOCAINE HYDROCHLORIDE: 20 SOLUTION ORAL; TOPICAL at 17:05

## 2023-04-19 RX ADMIN — ACETAMINOPHEN 650 MG: 325 TABLET ORAL at 21:05

## 2023-04-19 RX ADMIN — ENOXAPARIN SODIUM 40 MG: 100 INJECTION SUBCUTANEOUS at 09:52

## 2023-04-19 RX ADMIN — DULOXETINE HYDROCHLORIDE 20 MG: 20 CAPSULE, DELAYED RELEASE ORAL at 11:43

## 2023-04-19 RX ADMIN — LOSARTAN POTASSIUM 50 MG: 50 TABLET, FILM COATED ORAL at 09:53

## 2023-04-19 RX ADMIN — ONDANSETRON 4 MG: 2 INJECTION INTRAMUSCULAR; INTRAVENOUS at 09:52

## 2023-04-19 RX ADMIN — ATORVASTATIN CALCIUM 20 MG: 20 TABLET, FILM COATED ORAL at 09:52

## 2023-04-19 RX ADMIN — KETOROLAC TROMETHAMINE 15 MG: 30 INJECTION, SOLUTION INTRAMUSCULAR at 17:05

## 2023-04-19 RX ADMIN — TAMSULOSIN HYDROCHLORIDE 0.8 MG: 0.4 CAPSULE ORAL at 21:04

## 2023-04-19 RX ADMIN — METRONIDAZOLE 500 MG: 500 TABLET ORAL at 21:05

## 2023-04-19 RX ADMIN — CEFTRIAXONE 1000 MG: 1 INJECTION, POWDER, FOR SOLUTION INTRAMUSCULAR; INTRAVENOUS at 20:59

## 2023-04-19 RX ADMIN — PANTOPRAZOLE SODIUM 40 MG: 40 TABLET, DELAYED RELEASE ORAL at 09:52

## 2023-04-19 RX ADMIN — SODIUM CHLORIDE, PRESERVATIVE FREE 10 ML: 5 INJECTION INTRAVENOUS at 21:06

## 2023-04-19 RX ADMIN — ACETAMINOPHEN 650 MG: 325 TABLET ORAL at 04:42

## 2023-04-19 RX ADMIN — SODIUM CHLORIDE: 9 INJECTION, SOLUTION INTRAVENOUS at 04:44

## 2023-04-19 ASSESSMENT — PAIN - FUNCTIONAL ASSESSMENT
PAIN_FUNCTIONAL_ASSESSMENT: ACTIVITIES ARE NOT PREVENTED
PAIN_FUNCTIONAL_ASSESSMENT: ACTIVITIES ARE NOT PREVENTED

## 2023-04-19 ASSESSMENT — PAIN SCALES - GENERAL
PAINLEVEL_OUTOF10: 0
PAINLEVEL_OUTOF10: 0
PAINLEVEL_OUTOF10: 4
PAINLEVEL_OUTOF10: 5
PAINLEVEL_OUTOF10: 2
PAINLEVEL_OUTOF10: 7
PAINLEVEL_OUTOF10: 4
PAINLEVEL_OUTOF10: 0

## 2023-04-19 ASSESSMENT — PAIN DESCRIPTION - LOCATION
LOCATION: HEAD
LOCATION: PENIS
LOCATION: HEAD

## 2023-04-19 ASSESSMENT — PAIN DESCRIPTION - PAIN TYPE
TYPE: ACUTE PAIN
TYPE: ACUTE PAIN

## 2023-04-19 ASSESSMENT — PAIN DESCRIPTION - DESCRIPTORS
DESCRIPTORS: ACHING

## 2023-04-19 ASSESSMENT — PAIN SCALES - WONG BAKER: WONGBAKER_NUMERICALRESPONSE: 0

## 2023-04-19 ASSESSMENT — PAIN DESCRIPTION - FREQUENCY
FREQUENCY: INTERMITTENT
FREQUENCY: INTERMITTENT

## 2023-04-19 ASSESSMENT — PAIN DESCRIPTION - ONSET
ONSET: ON-GOING
ONSET: ON-GOING

## 2023-04-19 ASSESSMENT — PAIN DESCRIPTION - ORIENTATION
ORIENTATION: MID
ORIENTATION: MID

## 2023-04-20 LAB
BACTERIA UR CULT: ABNORMAL
GLUCOSE BLD-MCNC: 105 MG/DL (ref 70–99)
ORGANISM: ABNORMAL
PERFORMED ON: ABNORMAL

## 2023-04-20 PROCEDURE — 6370000000 HC RX 637 (ALT 250 FOR IP): Performed by: INTERNAL MEDICINE

## 2023-04-20 PROCEDURE — 2580000003 HC RX 258: Performed by: INTERNAL MEDICINE

## 2023-04-20 PROCEDURE — 6360000002 HC RX W HCPCS: Performed by: INTERNAL MEDICINE

## 2023-04-20 PROCEDURE — 1200000000 HC SEMI PRIVATE

## 2023-04-20 RX ADMIN — ACETAMINOPHEN 650 MG: 325 TABLET ORAL at 14:24

## 2023-04-20 RX ADMIN — PANTOPRAZOLE SODIUM 40 MG: 40 TABLET, DELAYED RELEASE ORAL at 06:22

## 2023-04-20 RX ADMIN — METRONIDAZOLE 500 MG: 500 TABLET ORAL at 14:23

## 2023-04-20 RX ADMIN — ACETAMINOPHEN 650 MG: 325 TABLET ORAL at 06:22

## 2023-04-20 RX ADMIN — DULOXETINE HYDROCHLORIDE 20 MG: 20 CAPSULE, DELAYED RELEASE ORAL at 07:57

## 2023-04-20 RX ADMIN — TAMSULOSIN HYDROCHLORIDE 0.8 MG: 0.4 CAPSULE ORAL at 20:39

## 2023-04-20 RX ADMIN — ACETAMINOPHEN 650 MG: 325 TABLET ORAL at 22:41

## 2023-04-20 RX ADMIN — SODIUM CHLORIDE, PRESERVATIVE FREE 10 ML: 5 INJECTION INTRAVENOUS at 20:39

## 2023-04-20 RX ADMIN — ASPIRIN 81 MG: 81 TABLET, COATED ORAL at 07:57

## 2023-04-20 RX ADMIN — ATORVASTATIN CALCIUM 20 MG: 20 TABLET, FILM COATED ORAL at 07:57

## 2023-04-20 RX ADMIN — METRONIDAZOLE 500 MG: 500 TABLET ORAL at 06:22

## 2023-04-20 RX ADMIN — CEFTRIAXONE 1000 MG: 1 INJECTION, POWDER, FOR SOLUTION INTRAMUSCULAR; INTRAVENOUS at 20:38

## 2023-04-20 RX ADMIN — FINASTERIDE 5 MG: 5 TABLET, FILM COATED ORAL at 07:57

## 2023-04-20 RX ADMIN — ENOXAPARIN SODIUM 40 MG: 100 INJECTION SUBCUTANEOUS at 07:57

## 2023-04-20 RX ADMIN — METRONIDAZOLE 500 MG: 500 TABLET ORAL at 22:41

## 2023-04-20 RX ADMIN — SODIUM CHLORIDE, PRESERVATIVE FREE 10 ML: 5 INJECTION INTRAVENOUS at 07:57

## 2023-04-20 RX ADMIN — LOSARTAN POTASSIUM 50 MG: 50 TABLET, FILM COATED ORAL at 07:57

## 2023-04-20 ASSESSMENT — PAIN SCALES - WONG BAKER: WONGBAKER_NUMERICALRESPONSE: 0

## 2023-04-20 ASSESSMENT — PAIN DESCRIPTION - PAIN TYPE
TYPE: ACUTE PAIN

## 2023-04-20 ASSESSMENT — PAIN SCALES - GENERAL
PAINLEVEL_OUTOF10: 2
PAINLEVEL_OUTOF10: 4
PAINLEVEL_OUTOF10: 2
PAINLEVEL_OUTOF10: 0

## 2023-04-20 ASSESSMENT — PAIN DESCRIPTION - ORIENTATION
ORIENTATION: ANTERIOR

## 2023-04-20 ASSESSMENT — PAIN - FUNCTIONAL ASSESSMENT
PAIN_FUNCTIONAL_ASSESSMENT: ACTIVITIES ARE NOT PREVENTED

## 2023-04-20 ASSESSMENT — PAIN DESCRIPTION - ONSET
ONSET: GRADUAL

## 2023-04-20 ASSESSMENT — PAIN DESCRIPTION - DESCRIPTORS
DESCRIPTORS: ACHING;THROBBING
DESCRIPTORS: ACHING;THROBBING
DESCRIPTORS: THROBBING

## 2023-04-20 ASSESSMENT — PAIN DESCRIPTION - LOCATION
LOCATION: HEAD

## 2023-04-20 ASSESSMENT — PAIN DESCRIPTION - FREQUENCY
FREQUENCY: INTERMITTENT

## 2023-04-21 PROCEDURE — 6360000002 HC RX W HCPCS: Performed by: HOSPITALIST

## 2023-04-21 PROCEDURE — 6370000000 HC RX 637 (ALT 250 FOR IP): Performed by: HOSPITALIST

## 2023-04-21 PROCEDURE — 2580000003 HC RX 258: Performed by: INTERNAL MEDICINE

## 2023-04-21 PROCEDURE — 6370000000 HC RX 637 (ALT 250 FOR IP): Performed by: INTERNAL MEDICINE

## 2023-04-21 PROCEDURE — 97530 THERAPEUTIC ACTIVITIES: CPT

## 2023-04-21 PROCEDURE — 6360000002 HC RX W HCPCS: Performed by: INTERNAL MEDICINE

## 2023-04-21 PROCEDURE — 1200000000 HC SEMI PRIVATE

## 2023-04-21 PROCEDURE — 97116 GAIT TRAINING THERAPY: CPT

## 2023-04-21 RX ORDER — HYDROCODONE BITARTRATE AND ACETAMINOPHEN 5; 325 MG/1; MG/1
1 TABLET ORAL EVERY 6 HOURS PRN
Status: DISCONTINUED | OUTPATIENT
Start: 2023-04-21 | End: 2023-04-24 | Stop reason: HOSPADM

## 2023-04-21 RX ORDER — CIPROFLOXACIN 2 MG/ML
400 INJECTION, SOLUTION INTRAVENOUS EVERY 12 HOURS
Status: DISCONTINUED | OUTPATIENT
Start: 2023-04-21 | End: 2023-04-24 | Stop reason: HOSPADM

## 2023-04-21 RX ADMIN — ONDANSETRON 4 MG: 2 INJECTION INTRAMUSCULAR; INTRAVENOUS at 17:57

## 2023-04-21 RX ADMIN — LOSARTAN POTASSIUM 50 MG: 50 TABLET, FILM COATED ORAL at 09:08

## 2023-04-21 RX ADMIN — DULOXETINE HYDROCHLORIDE 20 MG: 20 CAPSULE, DELAYED RELEASE ORAL at 09:08

## 2023-04-21 RX ADMIN — METRONIDAZOLE 500 MG: 500 TABLET ORAL at 06:16

## 2023-04-21 RX ADMIN — TAMSULOSIN HYDROCHLORIDE 0.8 MG: 0.4 CAPSULE ORAL at 20:25

## 2023-04-21 RX ADMIN — METRONIDAZOLE 500 MG: 500 TABLET ORAL at 15:17

## 2023-04-21 RX ADMIN — ENOXAPARIN SODIUM 40 MG: 100 INJECTION SUBCUTANEOUS at 09:09

## 2023-04-21 RX ADMIN — ATORVASTATIN CALCIUM 20 MG: 20 TABLET, FILM COATED ORAL at 09:09

## 2023-04-21 RX ADMIN — HYDROCODONE BITARTRATE AND ACETAMINOPHEN 1 TABLET: 5; 325 TABLET ORAL at 18:18

## 2023-04-21 RX ADMIN — SODIUM CHLORIDE, PRESERVATIVE FREE 10 ML: 5 INJECTION INTRAVENOUS at 09:08

## 2023-04-21 RX ADMIN — ASPIRIN 81 MG: 81 TABLET, COATED ORAL at 09:09

## 2023-04-21 RX ADMIN — ACETAMINOPHEN 650 MG: 325 TABLET ORAL at 09:14

## 2023-04-21 RX ADMIN — ACETAMINOPHEN 650 MG: 325 TABLET ORAL at 20:23

## 2023-04-21 RX ADMIN — METRONIDAZOLE 500 MG: 500 TABLET ORAL at 22:25

## 2023-04-21 RX ADMIN — ONDANSETRON 4 MG: 2 INJECTION INTRAMUSCULAR; INTRAVENOUS at 09:14

## 2023-04-21 RX ADMIN — PANTOPRAZOLE SODIUM 40 MG: 40 TABLET, DELAYED RELEASE ORAL at 06:16

## 2023-04-21 RX ADMIN — FINASTERIDE 5 MG: 5 TABLET, FILM COATED ORAL at 09:09

## 2023-04-21 RX ADMIN — CIPROFLOXACIN 400 MG: 2 INJECTION, SOLUTION INTRAVENOUS at 20:45

## 2023-04-21 ASSESSMENT — PAIN DESCRIPTION - PAIN TYPE
TYPE: ACUTE PAIN

## 2023-04-21 ASSESSMENT — PAIN DESCRIPTION - ORIENTATION
ORIENTATION: ANTERIOR
ORIENTATION: ANTERIOR
ORIENTATION: POSTERIOR;LOWER

## 2023-04-21 ASSESSMENT — PAIN DESCRIPTION - FREQUENCY
FREQUENCY: INTERMITTENT
FREQUENCY: INTERMITTENT
FREQUENCY: CONTINUOUS

## 2023-04-21 ASSESSMENT — PAIN DESCRIPTION - DESCRIPTORS
DESCRIPTORS: THROBBING
DESCRIPTORS: THROBBING;TIGHTNESS
DESCRIPTORS: ACHING

## 2023-04-21 ASSESSMENT — PAIN - FUNCTIONAL ASSESSMENT
PAIN_FUNCTIONAL_ASSESSMENT: ACTIVITIES ARE NOT PREVENTED
PAIN_FUNCTIONAL_ASSESSMENT: ACTIVITIES ARE NOT PREVENTED
PAIN_FUNCTIONAL_ASSESSMENT: PREVENTS OR INTERFERES SOME ACTIVE ACTIVITIES AND ADLS

## 2023-04-21 ASSESSMENT — PAIN SCALES - WONG BAKER: WONGBAKER_NUMERICALRESPONSE: 0

## 2023-04-21 ASSESSMENT — PAIN SCALES - GENERAL
PAINLEVEL_OUTOF10: 8
PAINLEVEL_OUTOF10: 5
PAINLEVEL_OUTOF10: 3
PAINLEVEL_OUTOF10: 8
PAINLEVEL_OUTOF10: 0
PAINLEVEL_OUTOF10: 3
PAINLEVEL_OUTOF10: 0

## 2023-04-21 ASSESSMENT — PAIN DESCRIPTION - ONSET
ONSET: ON-GOING
ONSET: GRADUAL
ONSET: ON-GOING

## 2023-04-21 ASSESSMENT — PAIN DESCRIPTION - LOCATION
LOCATION: HEAD

## 2023-04-22 LAB — PSA SERPL DL<=0.01 NG/ML-MCNC: 7.99 NG/ML (ref 0–4)

## 2023-04-22 PROCEDURE — 6370000000 HC RX 637 (ALT 250 FOR IP): Performed by: INTERNAL MEDICINE

## 2023-04-22 PROCEDURE — 6360000002 HC RX W HCPCS: Performed by: HOSPITALIST

## 2023-04-22 PROCEDURE — 6360000002 HC RX W HCPCS: Performed by: INTERNAL MEDICINE

## 2023-04-22 PROCEDURE — 1200000000 HC SEMI PRIVATE

## 2023-04-22 PROCEDURE — 2580000003 HC RX 258: Performed by: INTERNAL MEDICINE

## 2023-04-22 PROCEDURE — 6370000000 HC RX 637 (ALT 250 FOR IP): Performed by: HOSPITALIST

## 2023-04-22 RX ORDER — MORPHINE SULFATE 2 MG/ML
1 INJECTION, SOLUTION INTRAMUSCULAR; INTRAVENOUS ONCE
Status: COMPLETED | OUTPATIENT
Start: 2023-04-22 | End: 2023-04-22

## 2023-04-22 RX ADMIN — ATORVASTATIN CALCIUM 20 MG: 20 TABLET, FILM COATED ORAL at 08:02

## 2023-04-22 RX ADMIN — TAMSULOSIN HYDROCHLORIDE 0.8 MG: 0.4 CAPSULE ORAL at 21:37

## 2023-04-22 RX ADMIN — DULOXETINE HYDROCHLORIDE 20 MG: 20 CAPSULE, DELAYED RELEASE ORAL at 08:01

## 2023-04-22 RX ADMIN — ONDANSETRON 4 MG: 2 INJECTION INTRAMUSCULAR; INTRAVENOUS at 21:38

## 2023-04-22 RX ADMIN — FINASTERIDE 5 MG: 5 TABLET, FILM COATED ORAL at 08:01

## 2023-04-22 RX ADMIN — PANTOPRAZOLE SODIUM 40 MG: 40 TABLET, DELAYED RELEASE ORAL at 05:41

## 2023-04-22 RX ADMIN — ASPIRIN 81 MG: 81 TABLET, COATED ORAL at 08:02

## 2023-04-22 RX ADMIN — ENOXAPARIN SODIUM 40 MG: 100 INJECTION SUBCUTANEOUS at 08:00

## 2023-04-22 RX ADMIN — SODIUM CHLORIDE: 9 INJECTION, SOLUTION INTRAVENOUS at 08:11

## 2023-04-22 RX ADMIN — TRAZODONE HYDROCHLORIDE 50 MG: 50 TABLET ORAL at 21:44

## 2023-04-22 RX ADMIN — CIPROFLOXACIN 400 MG: 2 INJECTION, SOLUTION INTRAVENOUS at 19:56

## 2023-04-22 RX ADMIN — ONDANSETRON 4 MG: 2 INJECTION INTRAMUSCULAR; INTRAVENOUS at 08:01

## 2023-04-22 RX ADMIN — METRONIDAZOLE 500 MG: 500 TABLET ORAL at 21:37

## 2023-04-22 RX ADMIN — CIPROFLOXACIN 400 MG: 2 INJECTION, SOLUTION INTRAVENOUS at 08:12

## 2023-04-22 RX ADMIN — ONDANSETRON 4 MG: 2 INJECTION INTRAMUSCULAR; INTRAVENOUS at 15:00

## 2023-04-22 RX ADMIN — LOSARTAN POTASSIUM 50 MG: 50 TABLET, FILM COATED ORAL at 08:01

## 2023-04-22 RX ADMIN — MORPHINE SULFATE 1 MG: 2 INJECTION, SOLUTION INTRAMUSCULAR; INTRAVENOUS at 17:14

## 2023-04-22 RX ADMIN — ACETAMINOPHEN 650 MG: 325 TABLET ORAL at 19:50

## 2023-04-22 RX ADMIN — HYDROCODONE BITARTRATE AND ACETAMINOPHEN 1 TABLET: 5; 325 TABLET ORAL at 21:37

## 2023-04-22 RX ADMIN — ACETAMINOPHEN 650 MG: 325 TABLET ORAL at 05:44

## 2023-04-22 RX ADMIN — SODIUM CHLORIDE, PRESERVATIVE FREE 10 ML: 5 INJECTION INTRAVENOUS at 08:00

## 2023-04-22 RX ADMIN — HYDROCODONE BITARTRATE AND ACETAMINOPHEN 1 TABLET: 5; 325 TABLET ORAL at 15:00

## 2023-04-22 RX ADMIN — METRONIDAZOLE 500 MG: 500 TABLET ORAL at 14:00

## 2023-04-22 RX ADMIN — METRONIDAZOLE 500 MG: 500 TABLET ORAL at 05:41

## 2023-04-22 RX ADMIN — HYDROCODONE BITARTRATE AND ACETAMINOPHEN 1 TABLET: 5; 325 TABLET ORAL at 08:01

## 2023-04-22 ASSESSMENT — PAIN DESCRIPTION - DESCRIPTORS
DESCRIPTORS: ACHING

## 2023-04-22 ASSESSMENT — PAIN DESCRIPTION - LOCATION
LOCATION: HEAD

## 2023-04-22 ASSESSMENT — PAIN SCALES - GENERAL
PAINLEVEL_OUTOF10: 7
PAINLEVEL_OUTOF10: 3
PAINLEVEL_OUTOF10: 3
PAINLEVEL_OUTOF10: 8
PAINLEVEL_OUTOF10: 3
PAINLEVEL_OUTOF10: 7
PAINLEVEL_OUTOF10: 4
PAINLEVEL_OUTOF10: 0
PAINLEVEL_OUTOF10: 7

## 2023-04-22 ASSESSMENT — PAIN DESCRIPTION - FREQUENCY
FREQUENCY: CONTINUOUS
FREQUENCY: CONTINUOUS
FREQUENCY: INTERMITTENT

## 2023-04-22 ASSESSMENT — PAIN DESCRIPTION - DIRECTION: RADIATING_TOWARDS: INNER

## 2023-04-22 ASSESSMENT — PAIN DESCRIPTION - ORIENTATION
ORIENTATION: ANTERIOR

## 2023-04-22 ASSESSMENT — PAIN SCALES - WONG BAKER: WONGBAKER_NUMERICALRESPONSE: 0

## 2023-04-22 ASSESSMENT — PAIN DESCRIPTION - PAIN TYPE
TYPE: ACUTE PAIN

## 2023-04-22 ASSESSMENT — PAIN - FUNCTIONAL ASSESSMENT
PAIN_FUNCTIONAL_ASSESSMENT: PREVENTS OR INTERFERES SOME ACTIVE ACTIVITIES AND ADLS

## 2023-04-22 ASSESSMENT — PAIN DESCRIPTION - ONSET
ONSET: GRADUAL

## 2023-04-23 LAB
ANION GAP SERPL CALCULATED.3IONS-SCNC: 10 MMOL/L (ref 3–16)
BUN SERPL-MCNC: 9 MG/DL (ref 7–20)
CALCIUM SERPL-MCNC: 8.4 MG/DL (ref 8.3–10.6)
CHLORIDE SERPL-SCNC: 96 MMOL/L (ref 99–110)
CO2 SERPL-SCNC: 29 MMOL/L (ref 21–32)
CREAT SERPL-MCNC: 0.8 MG/DL (ref 0.8–1.3)
DEPRECATED RDW RBC AUTO: 15.6 % (ref 12.4–15.4)
GFR SERPLBLD CREATININE-BSD FMLA CKD-EPI: >60 ML/MIN/{1.73_M2}
GLUCOSE SERPL-MCNC: 111 MG/DL (ref 70–99)
HCT VFR BLD AUTO: 37.7 % (ref 40.5–52.5)
HGB BLD-MCNC: 12.7 G/DL (ref 13.5–17.5)
MAGNESIUM SERPL-MCNC: 1.7 MG/DL (ref 1.8–2.4)
MCH RBC QN AUTO: 30.8 PG (ref 26–34)
MCHC RBC AUTO-ENTMCNC: 33.7 G/DL (ref 31–36)
MCV RBC AUTO: 91.1 FL (ref 80–100)
PLATELET # BLD AUTO: 276 K/UL (ref 135–450)
PMV BLD AUTO: 8.4 FL (ref 5–10.5)
POTASSIUM SERPL-SCNC: 3.5 MMOL/L (ref 3.5–5.1)
RBC # BLD AUTO: 4.14 M/UL (ref 4.2–5.9)
SODIUM SERPL-SCNC: 135 MMOL/L (ref 136–145)
WBC # BLD AUTO: 8.5 K/UL (ref 4–11)

## 2023-04-23 PROCEDURE — 6370000000 HC RX 637 (ALT 250 FOR IP): Performed by: HOSPITALIST

## 2023-04-23 PROCEDURE — 85027 COMPLETE CBC AUTOMATED: CPT

## 2023-04-23 PROCEDURE — 6370000000 HC RX 637 (ALT 250 FOR IP): Performed by: INTERNAL MEDICINE

## 2023-04-23 PROCEDURE — 36415 COLL VENOUS BLD VENIPUNCTURE: CPT

## 2023-04-23 PROCEDURE — 1200000000 HC SEMI PRIVATE

## 2023-04-23 PROCEDURE — 6360000002 HC RX W HCPCS: Performed by: INTERNAL MEDICINE

## 2023-04-23 PROCEDURE — 83735 ASSAY OF MAGNESIUM: CPT

## 2023-04-23 PROCEDURE — 80048 BASIC METABOLIC PNL TOTAL CA: CPT

## 2023-04-23 PROCEDURE — 2580000003 HC RX 258: Performed by: INTERNAL MEDICINE

## 2023-04-23 PROCEDURE — 6360000002 HC RX W HCPCS: Performed by: HOSPITALIST

## 2023-04-23 RX ADMIN — DULOXETINE HYDROCHLORIDE 20 MG: 20 CAPSULE, DELAYED RELEASE ORAL at 09:26

## 2023-04-23 RX ADMIN — METRONIDAZOLE 500 MG: 500 TABLET ORAL at 21:46

## 2023-04-23 RX ADMIN — ASPIRIN 81 MG: 81 TABLET, COATED ORAL at 09:25

## 2023-04-23 RX ADMIN — HYDROCODONE BITARTRATE AND ACETAMINOPHEN 1 TABLET: 5; 325 TABLET ORAL at 09:25

## 2023-04-23 RX ADMIN — METRONIDAZOLE 500 MG: 500 TABLET ORAL at 06:09

## 2023-04-23 RX ADMIN — SODIUM CHLORIDE, PRESERVATIVE FREE 10 ML: 5 INJECTION INTRAVENOUS at 21:46

## 2023-04-23 RX ADMIN — METRONIDAZOLE 500 MG: 500 TABLET ORAL at 13:38

## 2023-04-23 RX ADMIN — ATORVASTATIN CALCIUM 20 MG: 20 TABLET, FILM COATED ORAL at 09:25

## 2023-04-23 RX ADMIN — ACETAMINOPHEN 650 MG: 325 TABLET ORAL at 06:42

## 2023-04-23 RX ADMIN — ACETAMINOPHEN 650 MG: 325 TABLET ORAL at 21:45

## 2023-04-23 RX ADMIN — LOSARTAN POTASSIUM 50 MG: 50 TABLET, FILM COATED ORAL at 09:25

## 2023-04-23 RX ADMIN — ONDANSETRON 4 MG: 2 INJECTION INTRAMUSCULAR; INTRAVENOUS at 13:38

## 2023-04-23 RX ADMIN — PANTOPRAZOLE SODIUM 40 MG: 40 TABLET, DELAYED RELEASE ORAL at 06:09

## 2023-04-23 RX ADMIN — CIPROFLOXACIN 400 MG: 2 INJECTION, SOLUTION INTRAVENOUS at 21:33

## 2023-04-23 RX ADMIN — CIPROFLOXACIN 400 MG: 2 INJECTION, SOLUTION INTRAVENOUS at 09:34

## 2023-04-23 RX ADMIN — ONDANSETRON 4 MG: 2 INJECTION INTRAMUSCULAR; INTRAVENOUS at 06:42

## 2023-04-23 RX ADMIN — TAMSULOSIN HYDROCHLORIDE 0.8 MG: 0.4 CAPSULE ORAL at 21:44

## 2023-04-23 RX ADMIN — TRAZODONE HYDROCHLORIDE 50 MG: 50 TABLET ORAL at 21:45

## 2023-04-23 RX ADMIN — FINASTERIDE 5 MG: 5 TABLET, FILM COATED ORAL at 09:25

## 2023-04-23 RX ADMIN — Medication 6 MG: at 21:45

## 2023-04-23 RX ADMIN — ENOXAPARIN SODIUM 40 MG: 100 INJECTION SUBCUTANEOUS at 09:25

## 2023-04-23 ASSESSMENT — PAIN DESCRIPTION - LOCATION
LOCATION: HEAD

## 2023-04-23 ASSESSMENT — PAIN DESCRIPTION - ORIENTATION
ORIENTATION: POSTERIOR
ORIENTATION: ANTERIOR
ORIENTATION: MID

## 2023-04-23 ASSESSMENT — PAIN DESCRIPTION - PAIN TYPE
TYPE: ACUTE PAIN
TYPE: ACUTE PAIN

## 2023-04-23 ASSESSMENT — PAIN DESCRIPTION - DESCRIPTORS
DESCRIPTORS: ACHING

## 2023-04-23 ASSESSMENT — PAIN SCALES - GENERAL
PAINLEVEL_OUTOF10: 3
PAINLEVEL_OUTOF10: 0
PAINLEVEL_OUTOF10: 3
PAINLEVEL_OUTOF10: 3

## 2023-04-23 ASSESSMENT — PAIN - FUNCTIONAL ASSESSMENT
PAIN_FUNCTIONAL_ASSESSMENT: ACTIVITIES ARE NOT PREVENTED
PAIN_FUNCTIONAL_ASSESSMENT: PREVENTS OR INTERFERES SOME ACTIVE ACTIVITIES AND ADLS

## 2023-04-23 ASSESSMENT — PAIN DESCRIPTION - FREQUENCY
FREQUENCY: CONTINUOUS
FREQUENCY: CONTINUOUS

## 2023-04-23 ASSESSMENT — PAIN DESCRIPTION - ONSET
ONSET: ON-GOING
ONSET: ON-GOING

## 2023-04-24 VITALS
OXYGEN SATURATION: 96 % | WEIGHT: 146.2 LBS | BODY MASS INDEX: 21.66 KG/M2 | RESPIRATION RATE: 16 BRPM | TEMPERATURE: 97.3 F | DIASTOLIC BLOOD PRESSURE: 84 MMHG | SYSTOLIC BLOOD PRESSURE: 150 MMHG | HEIGHT: 69 IN | HEART RATE: 60 BPM

## 2023-04-24 LAB
ANION GAP SERPL CALCULATED.3IONS-SCNC: 11 MMOL/L (ref 3–16)
BUN SERPL-MCNC: 8 MG/DL (ref 7–20)
CALCIUM SERPL-MCNC: 8.5 MG/DL (ref 8.3–10.6)
CHLORIDE SERPL-SCNC: 97 MMOL/L (ref 99–110)
CO2 SERPL-SCNC: 28 MMOL/L (ref 21–32)
CREAT SERPL-MCNC: 0.7 MG/DL (ref 0.8–1.3)
DEPRECATED RDW RBC AUTO: 15.9 % (ref 12.4–15.4)
GFR SERPLBLD CREATININE-BSD FMLA CKD-EPI: >60 ML/MIN/{1.73_M2}
GLUCOSE SERPL-MCNC: 88 MG/DL (ref 70–99)
HCT VFR BLD AUTO: 38.9 % (ref 40.5–52.5)
HGB BLD-MCNC: 12.8 G/DL (ref 13.5–17.5)
MCH RBC QN AUTO: 31 PG (ref 26–34)
MCHC RBC AUTO-ENTMCNC: 32.9 G/DL (ref 31–36)
MCV RBC AUTO: 94.2 FL (ref 80–100)
PLATELET # BLD AUTO: 280 K/UL (ref 135–450)
PMV BLD AUTO: 8.3 FL (ref 5–10.5)
POTASSIUM SERPL-SCNC: 3.7 MMOL/L (ref 3.5–5.1)
RBC # BLD AUTO: 4.13 M/UL (ref 4.2–5.9)
SODIUM SERPL-SCNC: 136 MMOL/L (ref 136–145)
TSH SERPL DL<=0.005 MIU/L-ACNC: 1.63 UIU/ML (ref 0.27–4.2)
WBC # BLD AUTO: 7.8 K/UL (ref 4–11)

## 2023-04-24 PROCEDURE — 6370000000 HC RX 637 (ALT 250 FOR IP): Performed by: INTERNAL MEDICINE

## 2023-04-24 PROCEDURE — 97530 THERAPEUTIC ACTIVITIES: CPT

## 2023-04-24 PROCEDURE — 80048 BASIC METABOLIC PNL TOTAL CA: CPT

## 2023-04-24 PROCEDURE — 36415 COLL VENOUS BLD VENIPUNCTURE: CPT

## 2023-04-24 PROCEDURE — 84443 ASSAY THYROID STIM HORMONE: CPT

## 2023-04-24 PROCEDURE — 6360000002 HC RX W HCPCS: Performed by: HOSPITALIST

## 2023-04-24 PROCEDURE — 82746 ASSAY OF FOLIC ACID SERUM: CPT

## 2023-04-24 PROCEDURE — 6360000002 HC RX W HCPCS: Performed by: INTERNAL MEDICINE

## 2023-04-24 PROCEDURE — 82306 VITAMIN D 25 HYDROXY: CPT

## 2023-04-24 PROCEDURE — 97116 GAIT TRAINING THERAPY: CPT

## 2023-04-24 PROCEDURE — 82607 VITAMIN B-12: CPT

## 2023-04-24 PROCEDURE — 97535 SELF CARE MNGMENT TRAINING: CPT

## 2023-04-24 PROCEDURE — 85027 COMPLETE CBC AUTOMATED: CPT

## 2023-04-24 RX ORDER — TRAMADOL HYDROCHLORIDE 50 MG/1
50 TABLET ORAL ONCE
Status: COMPLETED | OUTPATIENT
Start: 2023-04-24 | End: 2023-04-24

## 2023-04-24 RX ORDER — DULOXETIN HYDROCHLORIDE 30 MG/1
30 CAPSULE, DELAYED RELEASE ORAL DAILY
Qty: 30 CAPSULE | Refills: 3 | Status: SHIPPED | OUTPATIENT
Start: 2023-04-25

## 2023-04-24 RX ORDER — CIPROFLOXACIN 500 MG/1
500 TABLET, FILM COATED ORAL 2 TIMES DAILY
Qty: 8 TABLET | Refills: 0 | Status: SHIPPED | OUTPATIENT
Start: 2023-04-24 | End: 2023-04-28

## 2023-04-24 RX ORDER — DULOXETIN HYDROCHLORIDE 30 MG/1
30 CAPSULE, DELAYED RELEASE ORAL DAILY
Status: DISCONTINUED | OUTPATIENT
Start: 2023-04-25 | End: 2023-04-24 | Stop reason: HOSPADM

## 2023-04-24 RX ADMIN — LOSARTAN POTASSIUM 50 MG: 50 TABLET, FILM COATED ORAL at 08:28

## 2023-04-24 RX ADMIN — PANTOPRAZOLE SODIUM 40 MG: 40 TABLET, DELAYED RELEASE ORAL at 06:42

## 2023-04-24 RX ADMIN — DULOXETINE HYDROCHLORIDE 20 MG: 20 CAPSULE, DELAYED RELEASE ORAL at 08:28

## 2023-04-24 RX ADMIN — METRONIDAZOLE 500 MG: 500 TABLET ORAL at 15:39

## 2023-04-24 RX ADMIN — FINASTERIDE 5 MG: 5 TABLET, FILM COATED ORAL at 08:28

## 2023-04-24 RX ADMIN — ASPIRIN 81 MG: 81 TABLET, COATED ORAL at 08:28

## 2023-04-24 RX ADMIN — ATORVASTATIN CALCIUM 20 MG: 20 TABLET, FILM COATED ORAL at 08:28

## 2023-04-24 RX ADMIN — METRONIDAZOLE 500 MG: 500 TABLET ORAL at 06:42

## 2023-04-24 RX ADMIN — TRAMADOL HYDROCHLORIDE 50 MG: 50 TABLET, COATED ORAL at 00:22

## 2023-04-24 RX ADMIN — ACETAMINOPHEN 650 MG: 325 TABLET ORAL at 11:42

## 2023-04-24 RX ADMIN — ONDANSETRON 4 MG: 2 INJECTION INTRAMUSCULAR; INTRAVENOUS at 11:43

## 2023-04-24 RX ADMIN — CIPROFLOXACIN 400 MG: 2 INJECTION, SOLUTION INTRAVENOUS at 08:30

## 2023-04-24 ASSESSMENT — PAIN SCALES - GENERAL
PAINLEVEL_OUTOF10: 7
PAINLEVEL_OUTOF10: 3

## 2023-04-24 ASSESSMENT — PAIN DESCRIPTION - FREQUENCY
FREQUENCY: INTERMITTENT
FREQUENCY: CONTINUOUS

## 2023-04-24 ASSESSMENT — PAIN DESCRIPTION - LOCATION
LOCATION: HEAD
LOCATION: HEAD

## 2023-04-24 ASSESSMENT — PAIN DESCRIPTION - PAIN TYPE
TYPE: ACUTE PAIN
TYPE: ACUTE PAIN

## 2023-04-24 ASSESSMENT — PAIN DESCRIPTION - ONSET
ONSET: ON-GOING
ONSET: ON-GOING

## 2023-04-24 ASSESSMENT — PAIN DESCRIPTION - ORIENTATION
ORIENTATION: MID
ORIENTATION: MID

## 2023-04-24 ASSESSMENT — PAIN DESCRIPTION - DESCRIPTORS
DESCRIPTORS: ACHING
DESCRIPTORS: ACHING

## 2023-04-24 NOTE — DISCHARGE INSTR - COC
Continuity of Care Form    Patient Name: Karissa Bermudez   :  1945  MRN:  [id number]    Admit date:  2023  Discharge date:  23    Code Status Order: Full Code   Advance Directives:     Admitting Physician:  Aren Choudhary MD  PCP: TORRI Stuart CNP    Discharging Nurse: [facility] Unit/Room#: 4541/4861-18  Discharging Unit Phone Number: [phone]    Emergency Contact:   Extended Emergency Contact Information  Primary Emergency Contact: DUKE PALACIOS  Home Phone: [phone]  Relation: Child   needed? No    Past Surgical History:  Past Surgical History:   Procedure Laterality Date    APPENDECTOMY      COLONOSCOPY N/A 10/25/2022    COLONOSCOPY WITH BIOPSY performed by Tara Iglesias MD at [address] [place] [address]    NASAL SEPTUM SURGERY         Immunization History: There is no immunization history on file for this patient. Active Problems:  Patient Active Problem List   Diagnosis Code    Depression F32. A    Diverticulitis K57.92    Regional enteritis ([facility]) K50.90    Severe protein-calorie malnutrition ([address] E43    Weakness generalized R53.1    Diabetes ([address] E11.9    Osteoporosis M81.0    Cervical neuritis M54.12    Fluid retention R60.9    Memory difficulties R41.3    Current chronic use of systemic steroids Z79.52    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 ([facility]) E27.3, T38.0X5A    Vitamin D deficiency T12.7    Complicated UTI (urinary tract infection) N39.0    Dementia ([facility]) F03.90    BPH with obstruction/lower urinary tract symptoms N40.1, N13.8    Hyponatremia E87.1    Uncontrolled hypertension I10    HLD (hyperlipidemia) E78.5    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

## 2023-04-24 NOTE — PROGRESS NOTES
Pt has been A&Ox3, was uncertain about calendar day but all other questions answered appropriately. Pt was cleared for discharge after therapy evaluation, plan to return to memory care at [facility], son is aware per case management. Pt had asked if it was necessary that he return to memory care or if he could go to somewhere else, RN had made case management aware at the time of pt's concerns, and case management had talked to both son and pt. Transport is scheduled for 1845, IV removed, report was called to facility.      Electronically signed by Monique Eldridge RN on 4/24/2023 at 6:15 PM

## 2023-04-24 NOTE — PROGRESS NOTES
Physical Therapy    Daily Treatment Note          Discharge Recommendations:  Sebastian Moeller scored a 19/24 on the AM-PAC short mobility form. Current research shows that an AM-PAC score of 18 or greater is typically associated with a discharge to the patient's home setting. Based on the patient's AM-PAC score and their current functional mobility deficits, it is recommended that the patient have 2-3 sessions per week of Physical Therapy at d/c to increase the patient's independence. At this time, this patient demonstrates the endurance and safety to discharge home. Please see assessment section for further patient specific details. Assessment:  Pt needs encouragement for activity. Increased transfers and gait with use of walker today. Gait is slow, but steady. Pt needing increased cues for walker safety at times. Mariana Vick is a new device to patient. Feel pt is nearing functional baseline and can return to memory unit with increased assist as needed from staff  and home PT. If this is unavailable, consider short SNF, however question whether or not pt will consistently participate in therapy. Will follow. Equipment Needs:  Rolling walker    Chart Reviewed: Yes     Other position/activity restrictions: up with assist   Additional Pertinent Hx: 78 y.o. male presented to ED 4/18/23 for evaluation of abdominal pain. CT with ? Enteritis and irregularity of prostate/ bladder wall; CXR neg; UA positive UTI;  urology and palliative care consults. Pmhx: dementia      Diagnosis: enteritis/complicated UTI   Treatment Diagnosis: mobility impairment due to complicated UTI      Subjective:  Pt found supine in bed. Pt needing encouragement for PT. \"How far do we have to go? I'd rather be in bed. \"  Pt denies pain.      Pain:  Denies      Objective:    Bed mobility  Supine to sit:  Supervision (HOB raised)      Transfers  Sit to stand:  SBA (from bed to walker, CGA from commode to walker)  Stand to sit:  SBA

## 2023-04-24 NOTE — CONSULTS
[employer] [facility]. He is retired. No known history of abuse or trauma. No known legal issues.      Current Medications Ordered:   ciprofloxacin  400 mg IntraVENous Q12H    metroNIDAZOLE  500 mg Oral 3 times per day    lactated ringers bolus  500 mL IntraVENous Once    sodium chloride flush  5-40 mL IntraVENous 2 times per day    enoxaparin  40 mg SubCUTAneous Daily    aspirin  81 mg Oral Daily    DULoxetine  20 mg Oral Daily    finasteride  5 mg Oral Daily    losartan  50 mg Oral Daily    pantoprazole  40 mg Oral QAM AC    atorvastatin  20 mg Oral Daily    tamsulosin  0.8 mg Oral Nightly      PRN Meds: HYDROcodone 5 mg - acetaminophen, dicyclomine, sodium chloride flush, sodium chloride, ondansetron **OR** ondansetron, acetaminophen **OR** acetaminophen, polyethylene glycol, magnesium hydroxide, melatonin, OLANZapine, traZODone     ROS: See Medical H&PE     PE:    /68   Pulse 80   Temp (!) 96.1 °F (35.6 °C) (Axillary)   Resp 18   Ht 5' 9\" (1.753 m)   Wt 146 lb 3.2 oz (66.3 kg)   SpO2 94%   BMI 21.59 kg/m²       Motor / Gait: Observed laying in bed, gait deferred  AIMS: 0    Mental Status Examination:    Appearance: WM, appears stated age, lying in bed, covered in blankets, fair grooming and fair hygiene   Behavior/Attitude Toward Examiner: Difficult to engage in conversation, minimal eye contact  Speech: Delayed responses at times, decreased volume  Mood: \"I don't want to talk about that\"  Affect: Flat  Thought Processes: Difficult to fully assess due to limited engagement in conversation   Thought Content: Denies passive and active SI, no HI verbalized, no delusions voiced, no obsessions  Perceptions: No AVH verbalized, did not appear to be RTIS  Attention: Difficult to fully assess due to limited engagement in conversation  Cognition: Alert and oriented to person, place (\"[facility] [place]\") and month; appears disoriented to year and situation; immediate, recent, and remote recall

## 2023-04-24 NOTE — CARE COORDINATION
Case Management Assessment            Discharge Note                    Date / Time of Note: 4/24/2023 3:29 PM                  Discharge Note Completed by: Kaylie Acuña RN    Patient Name: Kathi Root   YOB: 1945  Diagnosis: Enteritis [K52.9]  Enlarged prostate [U38.0]  Complicated UTI (urinary tract infection) [N39.0]   Date / Time: 4/18/2023  2:01 PM    Current PCP: TORRI Mcknight CNP  Clinic patient: No    Hospitalization in the last 30 days: No    Advance Directives:  Code Status: Full Code  [place] DNR form completed and on chart: Not Indicated    Financial:  Payor: Mary Beth Alexising / Plan: Hillis Spatz PPO / Product Type: Medicare /      Pharmacy:    [facility] [address] [place] [address] [phone] - F [phone]  [address] [place] [address] [facility] [address]  Phone: [phone] Fax: [phone]      Assistance purchasing medications?: Potential Assistance Purchasing Medications: No  Assistance provided by Case Management: None at this time    Does patient want to participate in local refill/ meds to beds program?:      Meds To Beds General Rules:  1. Can ONLY be done Monday- Friday between 8:30am-5pm  2. Prescription(s) must be in pharmacy by 3pm to be filled same day  3. Copy of patient's insurance/ prescription drug card and patient face sheet must be sent along with the prescription(s)  4. Cost of Rx cannot be added to hospital bill. If financial assistance is needed, please contact unit  or ;  or  CANNOT provide pharmacy voucher for patients co-pays  5.  Patients can then  the prescription on their way out of the hospital at discharge, or pharmacy can deliver to the bedside if staff is available. (payment due at time of pick-up or delivery - cash, check, or card accepted)     Able to afford home medications/ co-pay costs: Yes    ADLS:  Current PT AM-PAC Score: 19 /24  Current OT

## 2023-04-24 NOTE — DISCHARGE INSTRUCTIONS
[email or web address]  --> Can search for provider based on location, insurance, etc.         [address] [facility] and Psychiatry (Medication Management)  [facility]  Location: [address]  Phone: [phone]    [facility]  Location: [facility] in [facility] [address], [address]. Phone: [phone]    [facility]  Location: Multiple offices in the [facility]  Phone: [phone] or [address]  Locations: Multiple locations in [place] and [place]  Phone: [phone]    The [address] [facility]  Location: [facility]  Phone: [address]  Location: Multiple offices in [place]   Phone: [phone] [address] [facility], [address] and [facility]  Location: offices in [place]  Phone: [address]  Location: [address]  Phone: [phone]    Dr. Zhang Branch   Location: [address]    Phone: [facility]  Location: [address]    Phone: [phone]    Mental Health Therapy Only, No Psychiatry (Medication Management)    [facility]  Location: [facility] [address]  Phone: [phone]    [facility]  Location: [address] [address]  Phone: [phone]

## 2023-04-24 NOTE — PROGRESS NOTES
Occupational Therapy  Facility/Department: [facility]  Occupational Therapy Progress Note    Name: Audie House  : 1945  MRN: [id number]  Date of Service: 2023    Discharge Recommendations:  Audie House scored a 20/24 on the AM-PAC ADL Inpatient form. Current research shows that an AM-PAC score of 18 or greater is typically associated with a discharge to the patient's home setting. Based on the patient's AM-PAC score, and their current ADL deficits, it is recommended that the patient have 2-3 sessions per week of Occupational Therapy at d/c to increase the patient's independence. At this time, this patient demonstrates the endurance and safety to discharge home with home services and a follow up treatment frequency of 2-3x/wk. Please see assessment section for further patient specific details. If patient discharges prior to next session this note will serve as a discharge summary. Please see below for the latest assessment towards goals. Patient Diagnosis(es): The primary encounter diagnosis was Enlarged prostate. Diagnoses of Enteritis and Complicated UTI (urinary tract infection) were also pertinent to this visit. Past Medical History:  has a past medical history of Cervical neuritis, Fluid retention, Hypertension, and Memory difficulties. Past Surgical History:  has a past surgical history that includes Appendectomy; Nasal septum surgery; Coronary stent placement (); and Colonoscopy (N/A, 10/25/2022). Treatment Diagnosis: impaired mobility,  transfers, ADL      Assessment   Performance deficits / Impairments: Decreased functional mobility ; Decreased ADL status; Decreased endurance  Assessment: Per discussing w/ case management team, pt is in memory care unit, but independent at baseline (no AD). Presently, pt is functioning at SB/CGA level for functional transfers and mobility using RW (was not using RW PTA). Did require Jayy for LB dressing task.  Pt

## 2023-04-24 NOTE — DISCHARGE SUMMARY
[facility]  Discharge Summary    Name:  Aleisha Saldaña /Age/Sex: 1945  (78 y.o. male)   MRN & CSN:  [id number] & [id number] Admission Date/Time: 2023  2:01 PM   Attending:  Gisell Hammond MD Discharging Physician: Gisell Hammond MD     Hospital Course:   Aleisha Saldaña is a 78 y.o.  male  who presents with Complicated UTI (urinary tract infection)    78 y.o. male who presented to the hospital on 2023 with a chief complaint of abdominal pain. Per ED documentation, The patient states that his abdominal pain has been going on for the past few days. He supposedly has a complaint about this at the nursing home. He had nausea previously but is not having vomiting. He endorses normal bowel movements. Per EMS his vital signs were unremarkable. The patient states that he is not urinating as much. He denies any known history of prostate problems. Patient denies any chest pain or breathing, lightheadedness or syncope. He was found to have a UTI. He reportedly has confusion at baseline. UTI urine culture positive for Enterobacter cloacae discontinued IV Rocephin changed to Cipro on 23- cont on dc to complete course x7 days  BPH with obstructive urinary symptoms continue with Flomax and Proscar urology consulted seen and evaluated. Hypertension controlled continue with home medication. Hyponatremia- now resolved. Hyperlipidemia on statin. Dementia continue with supportive care. Consulted psych- they adjusted cymbalta but do not feel that inpatient psych is necessary at this time- appreciate help. The patient expressed appropriate understanding of and agreement with the discharge recommendations, medications, and plan.      Consults this admission:  IP CONSULT TO HOSPITALIST  IP CONSULT TO PALLIATIVE CARE  IP CONSULT TO UROLOGY  IP CONSULT TO PSYCHIATRY      Discharge Instruction:   Handoff to PCP:     Follow up appointments: Psych   Primary care physician: 1-2

## 2023-04-24 NOTE — CARE COORDINATION
2:25 PM  Call recv'd from Kel Montero NP regarding pcp. Left printed PCP list at bedside. Electronically signed by Hattie Henriquez RN, CM on 4/24/2023 at 2:25 PM.  Phone: [phone]  Fax: [phone]      12:49 PM  Patient refusing to work with therapy, stating that he wants to go back home to [place]. Son states that his house has been sold for a [employer] long time. \" Patient states that he \"doesn't care any more\" and to let his son \"take over. \" Call placed to Claudette Grice, patient's son, to discuss dc planning. Son would really like patient to dc to SNF, but it was explained that patient would need to work with therapy in order to dc him to any SNF. Son VU, he stated he will call patient to attempt to talk with him and convince him to work with therapy. CM explained that patient can return to [facility] if patient does not want to dc to SNF. RN made aware. Electronically signed by Hattie Henriquez RN, CM on 4/24/2023 at 1:02 PM.  Phone: [phone]  Fax: [phone]      11:03 AM  [place] needs updated PT/OT notes for precert; patient refusing to work with therapy. CM will follow up with Son to discuss dc options. Patient is from [address] [place] [address] of [facility].     Electronically signed by Hattie Henriquez RN, RAJAN on 4/24/2023 at 11:04 AM.  Phone: [phone]  Fax: [phone]

## 2023-04-24 NOTE — PROGRESS NOTES
Physical Therapy and Occupational Therapy  No Treatment    Approached by RN that pt needed updated PT/OT notes for dc purposes. Attempted to see pt this morning. Pt found supine in the dark. Pt refusing to work with PT/OT, despite encouragement. Pt educated on role of PT/OT and pt continued to refuse stating, \"I don't want to do it. \"  Therapists told pt they could return later this morning and pt continued to state, \"I don't want to do it. \"  Again, pt advised that PT/OT will likely need to return later today for d/c purposes. Pt stated, \"they can't force me to talk to you. \"  Therapy asked pt about his plan for discharge and he responded, \"Ask my son. He seems to be making all my decisions without asking me. It's what happens when you get older. \"  RN made aware of pt's refusal.  Will attempt back per PT/OT plans of care as schedule allows and if pt is agreeable.       [place], PT [place] OTR/L #[id number]

## 2023-04-25 LAB
25(OH)D3 SERPL-MCNC: 13 NG/ML
FOLATE SERPL-MCNC: 11.65 NG/ML (ref 4.78–24.2)
VIT B12 SERPL-MCNC: 749 PG/ML (ref 211–911)

## 2023-09-07 ENCOUNTER — OFFICE VISIT (OUTPATIENT)
Dept: FAMILY MEDICINE CLINIC | Age: 78
End: 2023-09-07
Payer: MEDICARE

## 2023-09-07 VITALS
WEIGHT: 173.6 LBS | HEART RATE: 87 BPM | OXYGEN SATURATION: 93 % | TEMPERATURE: 97.5 F | DIASTOLIC BLOOD PRESSURE: 82 MMHG | SYSTOLIC BLOOD PRESSURE: 126 MMHG | BODY MASS INDEX: 25.64 KG/M2

## 2023-09-07 DIAGNOSIS — K50.10 CROHN'S DISEASE OF LARGE INTESTINE WITHOUT COMPLICATION (HCC): ICD-10-CM

## 2023-09-07 DIAGNOSIS — I10 PRIMARY HYPERTENSION: Primary | ICD-10-CM

## 2023-09-07 DIAGNOSIS — G47.09 OTHER INSOMNIA: ICD-10-CM

## 2023-09-07 DIAGNOSIS — F32.89 OTHER DEPRESSION: ICD-10-CM

## 2023-09-07 DIAGNOSIS — E55.9 VITAMIN D DEFICIENCY: ICD-10-CM

## 2023-09-07 DIAGNOSIS — M81.0 OSTEOPOROSIS, UNSPECIFIED OSTEOPOROSIS TYPE, UNSPECIFIED PATHOLOGICAL FRACTURE PRESENCE: ICD-10-CM

## 2023-09-07 DIAGNOSIS — Z79.52 CURRENT CHRONIC USE OF SYSTEMIC STEROIDS: ICD-10-CM

## 2023-09-07 DIAGNOSIS — R73.9 BLOOD GLUCOSE ELEVATED: ICD-10-CM

## 2023-09-07 DIAGNOSIS — E78.2 MIXED HYPERLIPIDEMIA: ICD-10-CM

## 2023-09-07 DIAGNOSIS — R41.3 MEMORY DIFFICULTIES: ICD-10-CM

## 2023-09-07 PROBLEM — E09.9: Status: RESOLVED | Noted: 2023-04-18 | Resolved: 2023-09-07

## 2023-09-07 PROBLEM — E11.9 DIABETES (HCC): Status: RESOLVED | Noted: 2023-02-01 | Resolved: 2023-09-07

## 2023-09-07 PROBLEM — K57.92 DIVERTICULITIS: Status: RESOLVED | Noted: 2021-11-25 | Resolved: 2023-09-07

## 2023-09-07 PROBLEM — K50.90 REGIONAL ENTERITIS (HCC): Status: RESOLVED | Noted: 2022-03-30 | Resolved: 2023-09-07

## 2023-09-07 PROBLEM — E87.1 HYPONATREMIA: Status: RESOLVED | Noted: 2023-04-18 | Resolved: 2023-09-07

## 2023-09-07 PROBLEM — T38.0X5A: Status: RESOLVED | Noted: 2023-04-18 | Resolved: 2023-09-07

## 2023-09-07 PROBLEM — N39.0 COMPLICATED UTI (URINARY TRACT INFECTION): Status: RESOLVED | Noted: 2023-04-18 | Resolved: 2023-09-07

## 2023-09-07 PROBLEM — R60.9 FLUID RETENTION: Status: RESOLVED | Noted: 2023-02-06 | Resolved: 2023-09-07

## 2023-09-07 PROBLEM — E43 SEVERE PROTEIN-CALORIE MALNUTRITION (HCC): Status: RESOLVED | Noted: 2021-11-27 | Resolved: 2023-09-07

## 2023-09-07 PROBLEM — R53.1 WEAKNESS GENERALIZED: Status: RESOLVED | Noted: 2022-01-30 | Resolved: 2023-09-07

## 2023-09-07 LAB — HBA1C MFR BLD: 5.8 %

## 2023-09-07 PROCEDURE — 1123F ACP DISCUSS/DSCN MKR DOCD: CPT | Performed by: FAMILY MEDICINE

## 2023-09-07 PROCEDURE — 3078F DIAST BP <80 MM HG: CPT | Performed by: FAMILY MEDICINE

## 2023-09-07 PROCEDURE — 3074F SYST BP LT 130 MM HG: CPT | Performed by: FAMILY MEDICINE

## 2023-09-07 PROCEDURE — 99204 OFFICE O/P NEW MOD 45 MIN: CPT | Performed by: FAMILY MEDICINE

## 2023-09-07 PROCEDURE — 83036 HEMOGLOBIN GLYCOSYLATED A1C: CPT | Performed by: FAMILY MEDICINE

## 2023-09-07 RX ORDER — SUCRALFATE ORAL 1 G/10ML
SUSPENSION ORAL
COMMUNITY
Start: 2023-08-28

## 2023-09-07 RX ORDER — OMEPRAZOLE 40 MG/1
40 CAPSULE, DELAYED RELEASE ORAL DAILY
COMMUNITY
Start: 2023-08-18

## 2023-09-07 RX ORDER — LOSARTAN POTASSIUM 50 MG/1
50 TABLET ORAL DAILY
COMMUNITY
Start: 2023-08-22

## 2023-09-07 RX ORDER — LISINOPRIL 5 MG/1
5 TABLET ORAL DAILY
COMMUNITY
Start: 2023-08-18

## 2023-09-07 SDOH — ECONOMIC STABILITY: FOOD INSECURITY: WITHIN THE PAST 12 MONTHS, THE FOOD YOU BOUGHT JUST DIDN'T LAST AND YOU DIDN'T HAVE MONEY TO GET MORE.: NEVER TRUE

## 2023-09-07 SDOH — ECONOMIC STABILITY: FOOD INSECURITY: WITHIN THE PAST 12 MONTHS, YOU WORRIED THAT YOUR FOOD WOULD RUN OUT BEFORE YOU GOT MONEY TO BUY MORE.: NEVER TRUE

## 2023-09-07 SDOH — ECONOMIC STABILITY: INCOME INSECURITY: HOW HARD IS IT FOR YOU TO PAY FOR THE VERY BASICS LIKE FOOD, HOUSING, MEDICAL CARE, AND HEATING?: NOT HARD AT ALL

## 2023-09-07 SDOH — HEALTH STABILITY: PHYSICAL HEALTH: ON AVERAGE, HOW MANY DAYS PER WEEK DO YOU ENGAGE IN MODERATE TO STRENUOUS EXERCISE (LIKE A BRISK WALK)?: 0 DAYS

## 2023-09-07 SDOH — ECONOMIC STABILITY: HOUSING INSECURITY
IN THE LAST 12 MONTHS, WAS THERE A TIME WHEN YOU DID NOT HAVE A STEADY PLACE TO SLEEP OR SLEPT IN A SHELTER (INCLUDING NOW)?: NO

## 2023-09-07 ASSESSMENT — PATIENT HEALTH QUESTIONNAIRE - PHQ9
1. LITTLE INTEREST OR PLEASURE IN DOING THINGS: 0
SUM OF ALL RESPONSES TO PHQ QUESTIONS 1-9: 0
SUM OF ALL RESPONSES TO PHQ QUESTIONS 1-9: 0
4. FEELING TIRED OR HAVING LITTLE ENERGY: 0
5. POOR APPETITE OR OVEREATING: 0
9. THOUGHTS THAT YOU WOULD BE BETTER OFF DEAD, OR OF HURTING YOURSELF: 0
SUM OF ALL RESPONSES TO PHQ9 QUESTIONS 1 & 2: 0
2. FEELING DOWN, DEPRESSED OR HOPELESS: 0
6. FEELING BAD ABOUT YOURSELF - OR THAT YOU ARE A FAILURE OR HAVE LET YOURSELF OR YOUR FAMILY DOWN: 0
SUM OF ALL RESPONSES TO PHQ QUESTIONS 1-9: 0
7. TROUBLE CONCENTRATING ON THINGS, SUCH AS READING THE NEWSPAPER OR WATCHING TELEVISION: 0
3. TROUBLE FALLING OR STAYING ASLEEP: 0
10. IF YOU CHECKED OFF ANY PROBLEMS, HOW DIFFICULT HAVE THESE PROBLEMS MADE IT FOR YOU TO DO YOUR WORK, TAKE CARE OF THINGS AT HOME, OR GET ALONG WITH OTHER PEOPLE: 0
8. MOVING OR SPEAKING SO SLOWLY THAT OTHER PEOPLE COULD HAVE NOTICED. OR THE OPPOSITE, BEING SO FIGETY OR RESTLESS THAT YOU HAVE BEEN MOVING AROUND A LOT MORE THAN USUAL: 0
SUM OF ALL RESPONSES TO PHQ QUESTIONS 1-9: 0

## 2023-09-11 NOTE — PROGRESS NOTES
list as well as with difficulty retrieving the medical history, I would first   try to get records from the assisted living facility to review and will advise accordingly. They verbalized   understanding and agreed with the plan. No other questions or concerns today and all the question and   concerns were appropriately answered.     I reviewed and discussed below mentioned labs with the patient today:    Lab Results   Component Value Date/Time    WBC 7.8 04/24/2023 06:40 AM    RBC 4.13 04/24/2023 06:40 AM    MCV 94.2 04/24/2023 06:40 AM    MCHC 32.9 04/24/2023 06:40 AM     Lab Results   Component Value Date/Time    ANIONGAP 11 04/24/2023 06:40 AM    GLUCOSE 88 04/24/2023 06:40 AM    BUN 8 04/24/2023 06:40 AM    CREATININE 0.7 04/24/2023 06:40 AM    AGRATIO 1.1 04/18/2023 02:37 PM    CALCIUM 8.5 04/24/2023 06:40 AM     Lab Results   Component Value Date/Time    PSA 7.99 04/19/2023 04:39 AM       Past Medical History:   Diagnosis Date    Anxiety     CAD (coronary artery disease)     Cancer ([address])     Skin cancer on neck    Cervical neuritis 02/06/2023    Depression     Fluid retention 02/06/2023    GERD (gastroesophageal reflux disease)     Headache     Hypertension     Irritable bowel syndrome     Memory difficulties 02/06/2023       Patient Active Problem List   Diagnosis    Depression    Osteoporosis    Cervical neuritis    Memory difficulties    Current chronic use of systemic steroids    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 ([address])    Vitamin D deficiency    Dementia ([address])    BPH with obstruction/lower urinary tract symptoms    Primary hypertension    HLD (hyperlipidemia)    Other insomnia       Past Surgical History:   Procedure Laterality Date    APPENDECTOMY      CARDIAC SURGERY      COLONOSCOPY N/A 10/25/2022    COLONOSCOPY WITH BIOPSY performed by Nathalie Burgess MD at [address] [place] [address]  2007    EYE SURGERY      NASAL SEPTUM SURGERY         Family History aspirin 81 mg oral delayed release tablet: 1 tab(s) orally once a day  atorvastatin 80 mg oral tablet: 1 tab(s) orally once a day  calcium (as carbonate and lactate)-vitamin D 200 mg-6.25 mcg oral tablet: 2 tab(s) orally 2 times a day  famotidine 10 mg oral tablet: 1 tab(s) orally once  Jardiance 10 mg oral tablet: 1 tab(s) orally once a day (in the morning)  Lantus 100 units/mL subcutaneous solution: 25 unit(s) subcutaneous  metFORMIN 850 mg oral tablet: 1 tab(s) orally twice  metoprolol succinate 25 mg oral tablet, extended release: 0.5 tab(s) orally once a day  nitroglycerin 0.3 mg sublingual tablet: 1 tab(s) sublingual every 5 minutes  ramipril 5 mg oral capsule: 1 cap(s) orally once a day  Trulicity Pen 0.75 mg/0.5 mL subcutaneous solution:    ascorbic acid 500 mg oral tablet: 1 tab(s) orally once a day  aspirin 81 mg oral delayed release tablet: 1 tab(s) orally once a day  atorvastatin 80 mg oral tablet: 1 tab(s) orally once a day  calcium (as carbonate and lactate)-vitamin D 200 mg-6.25 mcg oral tablet: 2 tab(s) orally 2 times a day  famotidine 10 mg oral tablet: 1 tab(s) orally once  ferrous sulfate 325 mg (65 mg elemental iron) oral tablet: 1 tab(s) orally once a day  Jardiance 10 mg oral tablet: 1 tab(s) orally once a day (in the morning)  Lantus 100 units/mL subcutaneous solution: 25 unit(s) subcutaneous  levoFLOXacin 750 mg oral tablet: 1 tab(s) orally once a day. 1 tablet once a day for 4 days.   metFORMIN 850 mg oral tablet: 1 tab(s) orally twice  metoprolol succinate 25 mg oral tablet, extended release: 0.5 tab(s) orally once a day  nitroglycerin 0.3 mg sublingual tablet: 1 tab(s) sublingual every 5 minutes  ramipril 5 mg oral capsule: 1 cap(s) orally once a day  Trulicity Pen 0.75 mg/0.5 mL subcutaneous solution:

## 2024-05-10 DIAGNOSIS — Z79.52 CURRENT CHRONIC USE OF SYSTEMIC STEROIDS: ICD-10-CM

## 2024-05-10 RX ORDER — PREDNISONE 10 MG/1
TABLET ORAL
Qty: 45 TABLET | OUTPATIENT
Start: 2024-05-10

## 2024-06-10 DIAGNOSIS — Z79.52 CURRENT CHRONIC USE OF SYSTEMIC STEROIDS: ICD-10-CM

## 2024-06-10 RX ORDER — PREDNISONE 10 MG/1
TABLET ORAL
Qty: 45 TABLET | OUTPATIENT
Start: 2024-06-10

## 2024-06-13 DIAGNOSIS — Z79.52 CURRENT CHRONIC USE OF SYSTEMIC STEROIDS: ICD-10-CM

## 2024-06-13 RX ORDER — PREDNISONE 10 MG/1
TABLET ORAL
Qty: 45 TABLET | OUTPATIENT
Start: 2024-06-13

## 2024-06-13 NOTE — TELEPHONE ENCOUNTER
OV: Feb 2023   NS: March 2023     I have prescribed the medicine over a year ago. He must be seeing someone else. He needs to contact pcp

## 2024-07-26 ENCOUNTER — TELEPHONE (OUTPATIENT)
Dept: FAMILY MEDICINE CLINIC | Age: 79
End: 2024-07-26

## 2024-07-26 NOTE — TELEPHONE ENCOUNTER
Pt is seeing Rhoda Monday  Daughter is calling because pt has dementia  She wants to make sure Rhoda  looks at a wound on his arm

## 2024-07-28 SDOH — HEALTH STABILITY: PHYSICAL HEALTH: ON AVERAGE, HOW MANY DAYS PER WEEK DO YOU ENGAGE IN MODERATE TO STRENUOUS EXERCISE (LIKE A BRISK WALK)?: 0 DAYS

## 2024-07-28 SDOH — HEALTH STABILITY: PHYSICAL HEALTH: ON AVERAGE, HOW MANY MINUTES DO YOU ENGAGE IN EXERCISE AT THIS LEVEL?: 0 MIN

## 2024-07-29 ENCOUNTER — OFFICE VISIT (OUTPATIENT)
Dept: FAMILY MEDICINE CLINIC | Age: 79
End: 2024-07-29
Payer: MEDICARE

## 2024-07-29 VITALS
TEMPERATURE: 96.9 F | WEIGHT: 190 LBS | DIASTOLIC BLOOD PRESSURE: 76 MMHG | BODY MASS INDEX: 28.14 KG/M2 | HEIGHT: 69 IN | OXYGEN SATURATION: 96 % | HEART RATE: 76 BPM | SYSTOLIC BLOOD PRESSURE: 130 MMHG

## 2024-07-29 DIAGNOSIS — I10 PRIMARY HYPERTENSION: ICD-10-CM

## 2024-07-29 DIAGNOSIS — N40.0 BENIGN PROSTATIC HYPERPLASIA WITHOUT LOWER URINARY TRACT SYMPTOMS: ICD-10-CM

## 2024-07-29 DIAGNOSIS — E27.3 ADRENAL INSUFFICIENCY DUE TO CORTICOSTEROID WITHDRAWAL (HCC): Primary | ICD-10-CM

## 2024-07-29 DIAGNOSIS — E55.9 VITAMIN D DEFICIENCY: ICD-10-CM

## 2024-07-29 DIAGNOSIS — R41.3 MEMORY DIFFICULTIES: ICD-10-CM

## 2024-07-29 DIAGNOSIS — G47.09 OTHER INSOMNIA: ICD-10-CM

## 2024-07-29 DIAGNOSIS — T38.0X5A STEROID-INDUCED DIABETES MELLITUS WITHOUT COMPLICATION (HCC): ICD-10-CM

## 2024-07-29 DIAGNOSIS — T38.0X5A ADRENAL INSUFFICIENCY DUE TO CORTICOSTEROID WITHDRAWAL (HCC): Primary | ICD-10-CM

## 2024-07-29 DIAGNOSIS — S52.502A CLOSED FRACTURE OF DISTAL END OF LEFT RADIUS, INITIAL ENCOUNTER: ICD-10-CM

## 2024-07-29 DIAGNOSIS — E09.9 STEROID-INDUCED DIABETES MELLITUS WITHOUT COMPLICATION (HCC): ICD-10-CM

## 2024-07-29 DIAGNOSIS — F32.0 CURRENT MILD EPISODE OF MAJOR DEPRESSIVE DISORDER, UNSPECIFIED WHETHER RECURRENT (HCC): ICD-10-CM

## 2024-07-29 DIAGNOSIS — E78.2 MIXED HYPERLIPIDEMIA: ICD-10-CM

## 2024-07-29 DIAGNOSIS — M81.0 OSTEOPOROSIS, UNSPECIFIED OSTEOPOROSIS TYPE, UNSPECIFIED PATHOLOGICAL FRACTURE PRESENCE: ICD-10-CM

## 2024-07-29 DIAGNOSIS — K50.10 CROHN'S DISEASE OF LARGE INTESTINE WITHOUT COMPLICATION (HCC): ICD-10-CM

## 2024-07-29 DIAGNOSIS — Z79.52 CURRENT CHRONIC USE OF SYSTEMIC STEROIDS: ICD-10-CM

## 2024-07-29 DIAGNOSIS — F01.A0 MILD VASCULAR DEMENTIA WITHOUT BEHAVIORAL DISTURBANCE, PSYCHOTIC DISTURBANCE, MOOD DISTURBANCE, OR ANXIETY (HCC): ICD-10-CM

## 2024-07-29 DIAGNOSIS — E74.819 DISORDERS OF GLUCOSE TRANSPORT, UNSPECIFIED (HCC): ICD-10-CM

## 2024-07-29 PROBLEM — G30.9 SEVERE ALZHEIMER'S DEMENTIA WITHOUT BEHAVIORAL DISTURBANCE, PSYCHOTIC DISTURBANCE, MOOD DISTURBANCE, OR ANXIETY (HCC): Status: ACTIVE | Noted: 2023-04-18

## 2024-07-29 PROBLEM — M54.12 CERVICAL NEURITIS: Status: RESOLVED | Noted: 2023-02-06 | Resolved: 2024-07-29

## 2024-07-29 PROBLEM — F02.C0 SEVERE ALZHEIMER'S DEMENTIA WITHOUT BEHAVIORAL DISTURBANCE, PSYCHOTIC DISTURBANCE, MOOD DISTURBANCE, OR ANXIETY (HCC): Status: ACTIVE | Noted: 2023-04-18

## 2024-07-29 PROCEDURE — 3075F SYST BP GE 130 - 139MM HG: CPT | Performed by: NURSE PRACTITIONER

## 2024-07-29 PROCEDURE — 1123F ACP DISCUSS/DSCN MKR DOCD: CPT | Performed by: NURSE PRACTITIONER

## 2024-07-29 PROCEDURE — G2211 COMPLEX E/M VISIT ADD ON: HCPCS | Performed by: NURSE PRACTITIONER

## 2024-07-29 PROCEDURE — 3078F DIAST BP <80 MM HG: CPT | Performed by: NURSE PRACTITIONER

## 2024-07-29 PROCEDURE — 99215 OFFICE O/P EST HI 40 MIN: CPT | Performed by: NURSE PRACTITIONER

## 2024-07-29 RX ORDER — INSULIN ASPART 100 [IU]/ML
7 INJECTION, SOLUTION INTRAVENOUS; SUBCUTANEOUS
COMMUNITY
Start: 2024-06-25

## 2024-07-29 RX ORDER — FLUTICASONE PROPIONATE 50 MCG
1 SPRAY, SUSPENSION (ML) NASAL 2 TIMES DAILY
COMMUNITY

## 2024-07-29 RX ORDER — HYDROXYZINE HYDROCHLORIDE 10 MG/1
10 TABLET, FILM COATED ORAL EVERY 8 HOURS PRN
COMMUNITY

## 2024-07-29 RX ORDER — GUAIFENESIN 600 MG/1
600 TABLET, EXTENDED RELEASE ORAL 2 TIMES DAILY
COMMUNITY

## 2024-07-29 ASSESSMENT — ENCOUNTER SYMPTOMS
ABDOMINAL PAIN: 0
NAUSEA: 0
EYE PAIN: 0
EYE REDNESS: 0
COUGH: 0
ABDOMINAL DISTENTION: 0
RHINORRHEA: 0
SINUS PRESSURE: 0
SINUS PAIN: 0
VOMITING: 0
DIARRHEA: 0
CHEST TIGHTNESS: 0
TROUBLE SWALLOWING: 0
WHEEZING: 0
SHORTNESS OF BREATH: 0
EYE ITCHING: 0
EYE DISCHARGE: 0
STRIDOR: 0
BACK PAIN: 0
CONSTIPATION: 0

## 2024-07-29 ASSESSMENT — PATIENT HEALTH QUESTIONNAIRE - PHQ9
3. TROUBLE FALLING OR STAYING ASLEEP: NOT AT ALL
SUM OF ALL RESPONSES TO PHQ9 QUESTIONS 1 & 2: 0
9. THOUGHTS THAT YOU WOULD BE BETTER OFF DEAD, OR OF HURTING YOURSELF: NOT AT ALL
SUM OF ALL RESPONSES TO PHQ QUESTIONS 1-9: 0
5. POOR APPETITE OR OVEREATING: NOT AT ALL
7. TROUBLE CONCENTRATING ON THINGS, SUCH AS READING THE NEWSPAPER OR WATCHING TELEVISION: NOT AT ALL
1. LITTLE INTEREST OR PLEASURE IN DOING THINGS: NOT AT ALL
4. FEELING TIRED OR HAVING LITTLE ENERGY: NOT AT ALL
6. FEELING BAD ABOUT YOURSELF - OR THAT YOU ARE A FAILURE OR HAVE LET YOURSELF OR YOUR FAMILY DOWN: NOT AT ALL
2. FEELING DOWN, DEPRESSED OR HOPELESS: NOT AT ALL
10. IF YOU CHECKED OFF ANY PROBLEMS, HOW DIFFICULT HAVE THESE PROBLEMS MADE IT FOR YOU TO DO YOUR WORK, TAKE CARE OF THINGS AT HOME, OR GET ALONG WITH OTHER PEOPLE: NOT DIFFICULT AT ALL
SUM OF ALL RESPONSES TO PHQ QUESTIONS 1-9: 0
8. MOVING OR SPEAKING SO SLOWLY THAT OTHER PEOPLE COULD HAVE NOTICED. OR THE OPPOSITE, BEING SO FIGETY OR RESTLESS THAT YOU HAVE BEEN MOVING AROUND A LOT MORE THAN USUAL: NOT AT ALL

## 2024-07-29 NOTE — ASSESSMENT & PLAN NOTE
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  Recent A1c indicated 16.  Is now on basal bolus insulin with sliding scale at his extended care memory [facility]   From: Giovanna Lao  To: Chanda Gomez  Sent: 3/13/2023 2:05 PM EDT  Subject: Bupropion    Hello,  I started my new medication on Thursday 3/9 and have taken it as directed each morning. I feel that my depression is actually worse and I'm not handling stressful situations as well as I normally would. No suicidal ideation. I understand this is a transition phase that takes several weeks. I just wanted to check in and ask for guidance. I have had most of the negative side effects. Not sleeping well, inability to focus, headaches, dry mouth, and again, worsened depression and negative thoughts.    Thank you

## 2024-07-29 NOTE — ASSESSMENT & PLAN NOTE
Chronic-stable on simvastatin 40 mg daily tolerating medication without side effects.  Will recheck labs in 2 months.

## 2024-07-29 NOTE — ASSESSMENT & PLAN NOTE
Chronic-stable and well-controlled on losartan 50 mg daily, lisinopril 5 mg daily.  Tolerating medications without side effects.  Recent labs reviewed BMP stable.

## 2024-07-29 NOTE — ASSESSMENT & PLAN NOTE
Chronic-unstable with nonpathological fracture from fall.  Secondary to long-term use of corticosteroids.  Patient encouraged to continue using rolling walker for safety and stability.

## 2024-07-29 NOTE — PROGRESS NOTES
visit.     MEDICATION LIST REVIEWED AND UPDATED AT TIME OF VISIT    Review of Systems   Constitutional:  Negative for chills, fatigue and fever.   HENT:  Negative for congestion, ear pain, hearing loss, rhinorrhea, sinus pressure, sinus pain, tinnitus and trouble swallowing.    Eyes:  Negative for pain, discharge, redness and itching.   Respiratory:  Negative for cough, chest tightness, shortness of breath, wheezing and stridor.    Cardiovascular:  Negative for chest pain, palpitations and leg swelling.   Gastrointestinal:  Negative for abdominal distention, abdominal pain, constipation, diarrhea, nausea and vomiting.   Genitourinary:  Negative for difficulty urinating, dysuria, hematuria and urgency.   Musculoskeletal:  Negative for back pain, joint swelling, myalgias and neck pain.   Skin:  Negative for rash and wound.   Neurological:  Negative for dizziness and headaches.       Vitals:    07/29/24 1135   BP: 130/76   Pulse: 76   Temp: 96.9 °F (36.1 °C)   SpO2: 96%   Weight: 86.2 kg (190 lb)   Height: 1.753 m (5' 9\")       Physical Exam  Constitutional:       General: He is not in acute distress.     Appearance: Normal appearance. He is well-developed. He is not diaphoretic.   HENT:      Head: Normocephalic and atraumatic.      Right Ear: Tympanic membrane, ear canal and external ear normal.      Left Ear: Tympanic membrane, ear canal and external ear normal.      Nose: Nose normal.      Mouth/Throat:      Mouth: Mucous membranes are moist.      Pharynx: No oropharyngeal exudate.   Eyes:      General:         Right eye: No discharge.         Left eye: No discharge.      Extraocular Movements: Extraocular movements intact.      Conjunctiva/sclera: Conjunctivae normal.      Pupils: Pupils are equal, round, and reactive to light.   Neck:      Thyroid: No thyromegaly.      Trachea: No tracheal deviation.   Cardiovascular:      Rate and Rhythm: Normal rate and regular rhythm.      Heart sounds: Normal heart sounds. No

## 2024-07-29 NOTE — ASSESSMENT & PLAN NOTE
Chronic- on 15mg prednisone daily, hyperglycemia as a result. Referral to endo and GI for re-evaluation

## 2024-07-29 NOTE — ASSESSMENT & PLAN NOTE
Chronic-uncertain stability previous PSA 7.9.  Will recheck today.  Asymptomatic remains on flomax

## 2024-08-12 ENCOUNTER — TELEPHONE (OUTPATIENT)
Dept: FAMILY MEDICINE CLINIC | Age: 79
End: 2024-08-12

## 2024-08-12 NOTE — TELEPHONE ENCOUNTER
Ursula from [facility] said the patient was sent to [facility] today for a abnormal EKG

## 2024-08-23 RX ORDER — ASPIRIN 81 MG/1
81 TABLET, COATED ORAL DAILY
Qty: 90 TABLET | Refills: 0 | Status: SHIPPED | OUTPATIENT
Start: 2024-08-23

## 2024-08-23 RX ORDER — CLOPIDOGREL BISULFATE 75 MG/1
75 TABLET ORAL DAILY
Qty: 90 TABLET | Refills: 0 | Status: SHIPPED | OUTPATIENT
Start: 2024-08-23

## 2024-08-23 RX ORDER — ATORVASTATIN CALCIUM 80 MG/1
80 TABLET, FILM COATED ORAL NIGHTLY
Qty: 90 TABLET | Refills: 0 | Status: SHIPPED | OUTPATIENT
Start: 2024-08-23

## 2024-08-28 ENCOUNTER — TELEPHONE (OUTPATIENT)
Dept: ENDOCRINOLOGY | Age: 79
End: 2024-08-28

## 2024-08-28 ENCOUNTER — TELEPHONE (OUTPATIENT)
Dept: CARDIOLOGY CLINIC | Age: 79
End: 2024-08-28

## 2024-08-28 NOTE — PROGRESS NOTES
[facility]     Outpatient Cardiology         Patient Name:  Farhan Rivera  Primary Care Physician: Rhoda Go APRN - CNP  09/04/24     Assessment & Plan    Assessment / Plan:     CAD - s/p PCI to RCA at [facility] 8/2024. Conitnue Plavix 75 mg daily, Aspirin 81 mg daily, record reviewed.  Has left main, LAD and LCx disease.  Will need to aggressively manage cholesterol.  Recheck lipid panel in 3 months.  Usage instructions for nitroglycerin reviewed.  HTN - will verify medications with assisted living facility.   HLD- last  8/13/24. Lipitor 80 mg daily OR Zocor. Will verify which medications patient is actually taking  Med list is inaccurate.  Has duplicate meds including lisinopril and losartan, Lipitor and Zocor listed.            Chief Complaint:     Chief Complaint   Patient presents with    New Patient    Coronary Artery Disease    Hypertension    Hyperlipidemia       History of Present Illness:       HPI     Farhan Riverais a 78 y.o. male with PMH of HTN, HLD, depression, and Alzheimers.    Here for evaluation of HTN, referred by Rhoda Go APRN - CNP.     Today he presents with his daughter-in-law who provides most of his history as he has severe dementia. He denies chest pain. He lives in assisted living at [facility].     Patient denies any chest pain, shortness of breath, palpitations, presyncope or syncope. No TIA. No claudication.     PMH  Past Medical History:   Diagnosis Date    Anxiety     CAD (coronary artery disease)     Cancer (HCC)     Skin cancer on neck    Cervical neuritis 02/06/2023    Depression     Fluid retention 02/06/2023    GERD (gastroesophageal reflux disease)     Headache     Hypertension     Irritable bowel syndrome     Memory difficulties 02/06/2023       PSH  Past Surgical History:   Procedure Laterality Date    APPENDECTOMY      CARDIAC SURGERY      COLONOSCOPY N/A 10/25/2022    COLONOSCOPY WITH BIOPSY performed by

## 2024-08-28 NOTE — TELEPHONE ENCOUNTER
LVM for pt/pts sons to return call. Pt is scheduled to see Dr. Muniz on 9/4/24. He was recently treated at [facility] and had LHC and stent placement with Dr. Puckett and has scheduled follow up with Dr. Puckett's office. Want to know if he plans to keep apt with [facility] or cancel.

## 2024-08-30 ENCOUNTER — TELEPHONE (OUTPATIENT)
Dept: FAMILY MEDICINE CLINIC | Age: 79
End: 2024-08-30

## 2024-08-30 NOTE — TELEPHONE ENCOUNTER
Ursula from [facility] called back. Stated Patient has REFUSED the ER.   Ursula did call the Family and they advised they were out of town.     I did go ahead and set an Appointment up for Tuesday 09/03/2024  Ursula is going to let the family know about that appointment.    Advised keep an extra eye on bp and do an extra check on him. Push fluids to keep hydrated. Call if there are any other concerns. If he gets worse call 911.     Ursula understood.

## 2024-08-30 NOTE — TELEPHONE ENCOUNTER
Ursula from [facility] called.   Patient is calling about stomach pain and that he has a known ulcer.   Is Refusing medications, but Ursula was able to get him to take his medications this afternoon.  Patient has been laying around all day. Not feeling the best.  Pt is now complaining of bilateral wrist pain - left wrist more swollen than the right.   No falls witnessed, patients also states he has not fallen.   Appointment suggested for evaluation. Refused due to transportation.     BP Am 114/56 Pulse 47  BP /95 Pulse 64    Spoke to MD and NP  Suggested appointment for eval.   Not able to come in to the Office suggested ER for Evaluation.     Advised and spoke to Ursula at [facility] about this and confirmed.

## 2024-09-03 ENCOUNTER — OFFICE VISIT (OUTPATIENT)
Dept: FAMILY MEDICINE CLINIC | Age: 79
End: 2024-09-03
Payer: MEDICARE

## 2024-09-03 VITALS
OXYGEN SATURATION: 95 % | SYSTOLIC BLOOD PRESSURE: 152 MMHG | HEART RATE: 85 BPM | HEIGHT: 69 IN | TEMPERATURE: 96.9 F | DIASTOLIC BLOOD PRESSURE: 80 MMHG | BODY MASS INDEX: 28.58 KG/M2 | WEIGHT: 193 LBS

## 2024-09-03 DIAGNOSIS — E09.9 STEROID-INDUCED DIABETES MELLITUS WITHOUT COMPLICATION (HCC): ICD-10-CM

## 2024-09-03 DIAGNOSIS — G30.9 SEVERE ALZHEIMER'S DEMENTIA WITHOUT BEHAVIORAL DISTURBANCE, PSYCHOTIC DISTURBANCE, MOOD DISTURBANCE, OR ANXIETY, UNSPECIFIED TIMING OF DEMENTIA ONSET (HCC): ICD-10-CM

## 2024-09-03 DIAGNOSIS — K50.10 CROHN'S DISEASE OF LARGE INTESTINE WITHOUT COMPLICATION (HCC): ICD-10-CM

## 2024-09-03 DIAGNOSIS — M25.532 LEFT WRIST PAIN: Primary | ICD-10-CM

## 2024-09-03 DIAGNOSIS — T38.0X5A STEROID-INDUCED DIABETES MELLITUS WITHOUT COMPLICATION (HCC): ICD-10-CM

## 2024-09-03 DIAGNOSIS — F02.C0 SEVERE ALZHEIMER'S DEMENTIA WITHOUT BEHAVIORAL DISTURBANCE, PSYCHOTIC DISTURBANCE, MOOD DISTURBANCE, OR ANXIETY, UNSPECIFIED TIMING OF DEMENTIA ONSET (HCC): ICD-10-CM

## 2024-09-03 PROBLEM — Z79.52 CURRENT CHRONIC USE OF SYSTEMIC STEROIDS: Status: RESOLVED | Noted: 2023-02-06 | Resolved: 2024-09-03

## 2024-09-03 PROBLEM — N40.0 BENIGN PROSTATIC HYPERPLASIA WITHOUT LOWER URINARY TRACT SYMPTOMS: Status: RESOLVED | Noted: 2023-04-18 | Resolved: 2024-09-03

## 2024-09-03 PROBLEM — S52.502A CLOSED FRACTURE OF DISTAL END OF LEFT RADIUS, INITIAL ENCOUNTER: Status: RESOLVED | Noted: 2024-06-22 | Resolved: 2024-09-03

## 2024-09-03 LAB
25(OH)D3 SERPL-MCNC: 16.7 NG/ML
CRP SERPL-MCNC: 6 MG/L (ref 0–5.1)
FOLATE SERPL-MCNC: 11.3 NG/ML (ref 4.78–24.2)
IRON SATN MFR SERPL: 20 % (ref 20–50)
IRON SERPL-MCNC: 57 UG/DL (ref 59–158)
TIBC SERPL-MCNC: 283 UG/DL (ref 260–445)
VIT B12 SERPL-MCNC: 326 PG/ML (ref 211–911)

## 2024-09-03 PROCEDURE — 1123F ACP DISCUSS/DSCN MKR DOCD: CPT | Performed by: NURSE PRACTITIONER

## 2024-09-03 PROCEDURE — 3079F DIAST BP 80-89 MM HG: CPT | Performed by: NURSE PRACTITIONER

## 2024-09-03 PROCEDURE — 99215 OFFICE O/P EST HI 40 MIN: CPT | Performed by: NURSE PRACTITIONER

## 2024-09-03 PROCEDURE — G2211 COMPLEX E/M VISIT ADD ON: HCPCS | Performed by: NURSE PRACTITIONER

## 2024-09-03 PROCEDURE — 3077F SYST BP >= 140 MM HG: CPT | Performed by: NURSE PRACTITIONER

## 2024-09-03 ASSESSMENT — ENCOUNTER SYMPTOMS
RESPIRATORY NEGATIVE: 1
ABDOMINAL PAIN: 1
EYES NEGATIVE: 1

## 2024-09-03 NOTE — ASSESSMENT & PLAN NOTE
New, uncertain prognosis, continue current plan pending work up below  Patient no longer on steroids however will need A1c checked today.

## 2024-09-03 NOTE — PROGRESS NOTES
Farhan Rivera (:  1945) is a 78 y.o. male,Established patient, here for evaluation of the following chief complaint(s):  Diabetes         Assessment & Plan  Left wrist pain   Chronic, at goal (stable), continue current treatment plan  Patient forgets he has broken his wrist.  Previous chart reviewed, he is seen by orthopedics through [facility] for this.  X-ray from  shows routine healing.  Encourage patient to ask for Tylenol when his wrist bothers him.  He will likely never have complete full pain-free range of motion of this wrist.       Crohn's disease of large intestine without complication (HCC)   Monitored by specialist- no acute findings meriting change in the plan consultation Specialist Notes reviewed.  He was weaned off of steroids and has been placed on mercaptopurine and mesalamine.  Daughter-in-law reports patient seen by Dr. Willoughby  Will get labs drawn today that he ordered.  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       Severe Alzheimer's dementia without behavioral disturbance, psychotic disturbance, mood disturbance, or anxiety, unspecified timing of dementia onset (HCC)   Chronic, at goal (stable), continue current treatment plan           No follow-ups on file.       Felicitas Hatch is a very pleasant 78-year-old  gentleman with history significant for severe Alzheimer's disease, steroid-induced diabetes type 2, Crohn's disease, status post closed fracture distal end left radius, BPH who presents today at the behest of his daughter-in-law with complaints of stomach pain and left wrist pain.  Patient denies

## 2024-09-04 ENCOUNTER — OFFICE VISIT (OUTPATIENT)
Dept: CARDIOLOGY CLINIC | Age: 79
End: 2024-09-04
Payer: MEDICARE

## 2024-09-04 VITALS
OXYGEN SATURATION: 93 % | BODY MASS INDEX: 28.65 KG/M2 | DIASTOLIC BLOOD PRESSURE: 70 MMHG | SYSTOLIC BLOOD PRESSURE: 110 MMHG | HEART RATE: 68 BPM | WEIGHT: 194 LBS

## 2024-09-04 DIAGNOSIS — Z95.5 STENTED CORONARY ARTERY: ICD-10-CM

## 2024-09-04 DIAGNOSIS — E78.2 MIXED HYPERLIPIDEMIA: ICD-10-CM

## 2024-09-04 DIAGNOSIS — I10 ESSENTIAL HYPERTENSION: Primary | ICD-10-CM

## 2024-09-04 DIAGNOSIS — I25.10 ATHEROSCLEROSIS OF NATIVE CORONARY ARTERY OF NATIVE HEART WITHOUT ANGINA PECTORIS: ICD-10-CM

## 2024-09-04 PROCEDURE — 3078F DIAST BP <80 MM HG: CPT | Performed by: INTERNAL MEDICINE

## 2024-09-04 PROCEDURE — 1123F ACP DISCUSS/DSCN MKR DOCD: CPT | Performed by: INTERNAL MEDICINE

## 2024-09-04 PROCEDURE — 99214 OFFICE O/P EST MOD 30 MIN: CPT | Performed by: INTERNAL MEDICINE

## 2024-09-04 PROCEDURE — 3074F SYST BP LT 130 MM HG: CPT | Performed by: INTERNAL MEDICINE

## 2024-09-04 PROCEDURE — 93000 ELECTROCARDIOGRAM COMPLETE: CPT | Performed by: INTERNAL MEDICINE

## 2024-09-04 RX ORDER — MESALAMINE 1.2 G/1
2.4 TABLET, DELAYED RELEASE ORAL
COMMUNITY

## 2024-09-04 RX ORDER — NITROGLYCERIN 0.4 MG/1
0.4 TABLET SUBLINGUAL EVERY 5 MIN PRN
COMMUNITY
Start: 2024-08-14

## 2024-09-04 RX ORDER — MERCAPTOPURINE 50 MG/1
100 TABLET ORAL EVERY MORNING
COMMUNITY

## 2024-09-06 LAB
GAMMA INTERFERON BACKGROUND BLD IA-ACNC: 0.11 IU/ML
M TB IFN-G BLD-IMP: NEGATIVE
M TB IFN-G CD4+ BCKGRND COR BLD-ACNC: 0 IU/ML
M TB IFN-G CD4+CD8+ BCKGRND COR BLD-ACNC: 0 IU/ML
MITOGEN IGNF BCKGRD COR BLD-ACNC: 8.26 IU/ML

## 2024-09-10 ENCOUNTER — TELEPHONE (OUTPATIENT)
Dept: FAMILY MEDICINE CLINIC | Age: 79
End: 2024-09-10

## 2024-09-10 RX ORDER — LOSARTAN POTASSIUM 100 MG/1
100 TABLET ORAL DAILY
Qty: 90 TABLET | Refills: 1 | Status: SHIPPED | OUTPATIENT
Start: 2024-09-10

## 2024-09-10 NOTE — TELEPHONE ENCOUNTER
Reema placed to home health care and his nursing facility.  Medications clarified.  Patient running elevated blood pressures for home health care.  Will increase losartan to 100 mg daily.  Order has been placed in system.

## 2024-09-13 DIAGNOSIS — Z79.52 CURRENT CHRONIC USE OF SYSTEMIC STEROIDS: ICD-10-CM

## 2024-09-16 RX ORDER — PREDNISONE 10 MG/1
TABLET ORAL
Qty: 30 TABLET | Refills: 1 | Status: SHIPPED | OUTPATIENT
Start: 2024-09-16

## 2024-09-17 RX ORDER — FLUTICASONE PROPIONATE 50 MCG
SPRAY, SUSPENSION (ML) NASAL
Qty: 16 G | Status: SHIPPED | OUTPATIENT
Start: 2024-09-17

## 2024-09-23 ENCOUNTER — TELEPHONE (OUTPATIENT)
Dept: ENDOCRINOLOGY | Age: 79
End: 2024-09-23

## 2024-09-23 NOTE — TELEPHONE ENCOUNTER
Call from pt's daughter in law Lesly wanting to know if Dr. Pabon would allow the pt to reestablish care?    Pt has been dismissed for non-compliance     Lesly stated that the pt has dementia and has been very ill and she is currently responsible for his care. She stated that is the reasons for the no shows     Please advise   CB# [phone] Lesly

## 2024-09-25 RX ORDER — ASPIRIN 81 MG/1
81 TABLET, COATED ORAL DAILY
Qty: 14 TABLET | Status: SHIPPED | OUTPATIENT
Start: 2024-09-25

## 2024-10-15 ENCOUNTER — TELEPHONE (OUTPATIENT)
Dept: FAMILY MEDICINE CLINIC | Age: 79
End: 2024-10-15

## 2024-10-15 NOTE — TELEPHONE ENCOUNTER
[facility]    atorvastatin (LIPITOR) 80 MG tablet [[id number]]    Order Details  Dose: 80 mg Route: Oral Frequency: NIGHTLY   Dispense Quantity: 90 tablet Refills: 0          Sig: TAKE 1 TABLET BY MOUTH AT BEDTIME         clopidogrel (PLAVIX) 75 MG tablet [[id number]]    Order Details  Dose: 75 mg Route: Oral Frequency: DAILY   Dispense Quantity: 90 tablet Refills: 0          Sig: TAKE 1 TABLET BY MOUTH DAILY         [facility] - [place], [place] - [address] [place] [address] [phone] -  [phone] [[id number]]     9/3/24  9/4/24

## 2024-10-15 NOTE — TELEPHONE ENCOUNTER
Requested Prescriptions     Pending Prescriptions Disp Refills    atorvastatin (LIPITOR) 80 MG tablet [Pharmacy Med Name: Atorvastatin Calcium 80 MG Tablet] 14 tablet 10     Sig: TAKE 1 TABLET BY MOUTH AT BEDTIME    clopidogrel (PLAVIX) 75 MG tablet [Pharmacy Med Name: Clopidogrel Bisulfate 75 MG Tablet] 14 tablet 10     Sig: TAKE 1 TABLET BY MOUTH ONCE DAILY          Last Office Visit: 9/3/2024     Next Office Visit: 10/15/2024

## 2024-10-16 ENCOUNTER — TELEPHONE (OUTPATIENT)
Dept: FAMILY MEDICINE CLINIC | Age: 79
End: 2024-10-16

## 2024-10-16 RX ORDER — CLOPIDOGREL BISULFATE 75 MG/1
75 TABLET ORAL DAILY
Qty: 14 TABLET | Refills: 10 | Status: SHIPPED | OUTPATIENT
Start: 2024-10-16

## 2024-10-16 RX ORDER — ATORVASTATIN CALCIUM 80 MG/1
80 TABLET, FILM COATED ORAL NIGHTLY
Qty: 14 TABLET | Refills: 10 | Status: SHIPPED | OUTPATIENT
Start: 2024-10-16

## 2024-10-16 NOTE — TELEPHONE ENCOUNTER
[facility] is calling to see if patient should be on prednisone if so please call Jaycee to let her know is she should resume administering the medication. Her number is [phone].

## 2024-11-18 DIAGNOSIS — R73.9 BLOOD GLUCOSE ELEVATED: Primary | ICD-10-CM

## 2024-11-19 RX ORDER — PEN NEEDLE, DIABETIC, SAFETY 30 GX3/16"
NEEDLE, DISPOSABLE MISCELLANEOUS
Qty: 100 EACH | Refills: 1 | Status: SHIPPED | OUTPATIENT
Start: 2024-11-19

## 2024-11-19 NOTE — TELEPHONE ENCOUNTER
Last appointment: 9/3/2024  Next appointment: Visit date not found  Last refill: 10/1/24  Requested Prescriptions     Pending Prescriptions Disp Refills    BD AUTOSHIELD DUO 30G X 5 MM MISC [Pharmacy Med Name: BD AutoShield Duo 30G X 5 MM Miscellaneous] 100 each PRN     Sig: USE UP TO FOUR TIMES DAILY TO INJECT INSULIN

## 2024-11-22 NOTE — TELEPHONE ENCOUNTER
Medication:   Requested Prescriptions     Pending Prescriptions Disp Refills    EASYMAX TEST strip [Pharmacy Med Name: EasyMax Test Strip] 50 strip PRN     Sig: CHECK BLOOD GLUCOSE FOUR TIMES DAILY     Last Filled:      Last appt: 9/3/2024   Next appt: Visit date not found    Last OARRS:        No data to display

## 2024-11-22 NOTE — TELEPHONE ENCOUNTER
Medication:   Requested Prescriptions     Pending Prescriptions Disp Refills    Alcohol Swabs (EASY TOUCH ALCOHOL PREP MEDIUM) 70 % PADS [Pharmacy Med Name: Easy Touch Alcohol Prep Medium 70 % Pads]  PRN     Sig: USE UP TO FOUR TIMES DAILY TO INJECT INSULIN     Last Filled:      Last appt: 9/3/2024   Next appt: Visit date not found    Last OARRS:        No data to display

## 2024-11-22 NOTE — TELEPHONE ENCOUNTER
alfredo from the [facility] patient need a reval about getting his Blood sugar . Would like to request a A1C lab for the patient.Alfredo phone number is [phone] .

## 2024-11-26 ENCOUNTER — TELEPHONE (OUTPATIENT)
Dept: FAMILY MEDICINE CLINIC | Age: 79
End: 2024-11-26

## 2024-11-26 DIAGNOSIS — R73.9 BLOOD GLUCOSE ELEVATED: Primary | ICD-10-CM

## 2024-11-26 RX ORDER — BLOOD-GLUCOSE METER
KIT MISCELLANEOUS
Qty: 50 STRIP | Status: SHIPPED | OUTPATIENT
Start: 2024-11-26

## 2024-11-26 RX ORDER — ISOPROPYL ALCOHOL 70 ML/100ML
SWAB TOPICAL
Qty: 100 EACH | Status: SHIPPED | OUTPATIENT
Start: 2024-11-26

## 2024-11-26 NOTE — TELEPHONE ENCOUNTER
Ursula from home care called to say that pt did stop taking prednisone. Pt is also in need of blood sugar supplies lancets and needles she's asking if a prescription can be faxed to [phone]. She also wanted to know if he should still be taking insulin because his sugars range from 110-160. Ursula call back number is [phone].

## 2024-12-02 RX ORDER — BLOOD-GLUCOSE METER
KIT MISCELLANEOUS
Qty: 50 STRIP | Status: SHIPPED | OUTPATIENT
Start: 2024-12-02

## 2024-12-02 NOTE — TELEPHONE ENCOUNTER
Requested Prescriptions     Pending Prescriptions Disp Refills    EASYMAX TEST strip [Pharmacy Med Name: EasyMax Test Strip] 50 strip PRN     Sig: CHECK BLOOD GLUCOSE THREE TIMES DAILY          Last Office Visit: 9/3/2024     Next Office Visit: Visit date not found

## 2024-12-18 ENCOUNTER — TELEPHONE (OUTPATIENT)
Dept: FAMILY MEDICINE CLINIC | Age: 79
End: 2024-12-18

## 2024-12-18 NOTE — TELEPHONE ENCOUNTER
Medication list was sent over from [facility] for us to review. Pt is taking meds that may no longer been needed. He is taking a lot of medication.   Fax:  [phone]

## 2024-12-26 ENCOUNTER — TELEPHONE (OUTPATIENT)
Dept: FAMILY MEDICINE CLINIC | Age: 79
End: 2024-12-26

## 2024-12-26 RX ORDER — HYDROXYZINE HYDROCHLORIDE 10 MG/1
TABLET, FILM COATED ORAL
Qty: 30 TABLET | Status: SHIPPED | OUTPATIENT
Start: 2024-12-26

## 2024-12-26 NOTE — TELEPHONE ENCOUNTER
Medication:   Requested Prescriptions     Pending Prescriptions Disp Refills    hydrOXYzine HCl (ATARAX) 10 MG tablet [Pharmacy Med Name: hydrOXYzine HCl 10 MG Tablet] 30 tablet PRN     Sig: TAKE 1 TABLET BY MOUTH EVERY 12 HOURS AS NEEDED FOR ANXIETY     Last Filled:      Last appt: 9/3/2024   Next appt: Visit date not found    Last OARRS:        No data to display

## 2024-12-27 NOTE — TELEPHONE ENCOUNTER
An aid called the emergency line the night of 12/26/24 stating that patient had wrist pain and swelling, I advised them to take patient in to the ER/ Urgent Care to be evaluated in person, they initially agreed but then called the emergency line demanding an xray be ordered tonight. I again recommended in person evaluation via ER/Urgent care tonight for assessment and to determine if an xray or more advanced scan or blood work may be necessary since I currently can't evaluate the patient in person, they were agreeable

## 2024-12-27 NOTE — TELEPHONE ENCOUNTER
Spoke to Shae at the [facility]. [phone] is number to reach back to patient's nurse.    She is with him this evening.     Concerns were that he was tired of taking all the medications. To many.   Doesn't want to get stuck all the time.     Wondering if they can do an A1c to see where he is at?

## 2024-12-27 NOTE — PROGRESS NOTES
Phone call discussion with Shae patient's nurse at his extended care facility.  Reviewed medications that can be discontinued.  She will send over the final order for us to fax back.  Patient no longer on prednisone, fingersticks without insulin have been in the 1 10-1 20 range even after eating.

## 2025-01-07 ENCOUNTER — TELEPHONE (OUTPATIENT)
Dept: FAMILY MEDICINE CLINIC | Age: 80
End: 2025-01-07

## 2025-01-07 NOTE — TELEPHONE ENCOUNTER
Patient is requesting labs to be put in the system for an upcoming appoint.   Not sure what labs you will need.

## 2025-01-14 ENCOUNTER — TELEPHONE (OUTPATIENT)
Dept: CARDIOLOGY CLINIC | Age: 80
End: 2025-01-14

## 2025-01-14 NOTE — TELEPHONE ENCOUNTER
Patient would like to know if pt should fast before lab work. Patient has an appt on 1/24/25.     Can reach pt at [phone]

## 2025-01-20 NOTE — PROGRESS NOTES
[facility]     Outpatient Cardiology         Patient Name:  Farhan Rivera  Primary Care Physician: Rhoda Go, APRN - CNP  01/24/25     Assessment & Plan    Assessment / Plan:     Atherosclerotic heart disease status post PCI to RCA in August 2024.  On appropriate medical therapy.  Has residual left main, LAD and left circumflex disease.  Will wait for lipid panel results.  Essential hypertension-controlled.  Continue current medications.  Hyperlipidemia-recheck lipid panel.  Labs drawn today.  Follow-up in 6 months or sooner if needed.              Chief Complaint:     Chief Complaint   Patient presents with    3 Month Follow-Up    Hyperlipidemia    Hypertension       History of Present Illness:       HPI     Farhan Riverais a 79 y.o. male with PMH of HTN, HLD, depression, and Alzheimer's here for a follow up.    Today he reports he is doing well.   No leg swelling.  No paroxysmal dyspnea.  No orthopnea   Patient denies any chest pain, shortness of breath, palpitations, presyncope or syncope. No TIA. No claudication.     PMH  Past Medical History:   Diagnosis Date    Anxiety     CAD (coronary artery disease)     Cancer (HCC)     Skin cancer on neck    Cervical neuritis 02/06/2023    Depression     Fluid retention 02/06/2023    GERD (gastroesophageal reflux disease)     Headache     Hypertension     Irritable bowel syndrome     Memory difficulties 02/06/2023       PSH  Past Surgical History:   Procedure Laterality Date    APPENDECTOMY      CARDIAC SURGERY      COLONOSCOPY N/A 10/25/2022    COLONOSCOPY WITH BIOPSY performed by Jhon Martinez MD at [facility] ENDOSCOPY    CORONARY STENT PLACEMENT  2007    EYE SURGERY      NASAL SEPTUM SURGERY          Social HIstory  Social History     Tobacco Use    Smoking status: Never    Smokeless tobacco: Never   Vaping Use    Vaping status: Never Used   Substance Use Topics    Alcohol use: Not Currently    Drug use: Not Currently

## 2025-01-20 NOTE — PATIENT INSTRUCTIONS
Thank you for choosing [facility] for your cardiac care.    During your visit today, we reviewed and confirmed your cardiac medications along with  medication prescribed by your other healthcare team members. Please be sure to discuss any  changes to medication with your providers.    Please bring a list of ALL medications (or the bottles) with you to EVERY appointment.  Also include vitamins and over-the-counter medications.    If you need refills for any cardiac medications, please call your pharmacy and they will reach out to us electronically.    Did your provider order testing today? If yes, then you will receive your results in three  possible ways. You can receive a [employer] message, a phone call, or letter in the mail. Please  note, if you are an active [employer] user, some of your testing will be available within 1-2 days.    Finally, please know that it is good for your heart to exercise and follow a healthy, low-fat diet  as advised by your physician and health care providers.    If you are experiencing a medical emergency, please call 911 immediately.    It's easy to register for a [employer] account if you don't already have one. With a [employer]  account you can manage your health record, view test results, schedule appointments and  more.     Dr. Muniz's clinical staff can be reached at the following phone number: [phone]    If any cardiac testing is ordered, please contact central scheduling at [phone] to get your test scheduled.

## 2025-01-24 ENCOUNTER — OFFICE VISIT (OUTPATIENT)
Dept: CARDIOLOGY CLINIC | Age: 80
End: 2025-01-24
Payer: MEDICARE

## 2025-01-24 VITALS
OXYGEN SATURATION: 98 % | BODY MASS INDEX: 26.29 KG/M2 | HEART RATE: 68 BPM | DIASTOLIC BLOOD PRESSURE: 72 MMHG | SYSTOLIC BLOOD PRESSURE: 118 MMHG | WEIGHT: 178 LBS

## 2025-01-24 DIAGNOSIS — E78.2 MIXED HYPERLIPIDEMIA: ICD-10-CM

## 2025-01-24 DIAGNOSIS — N40.0 BENIGN PROSTATIC HYPERPLASIA WITHOUT LOWER URINARY TRACT SYMPTOMS: ICD-10-CM

## 2025-01-24 DIAGNOSIS — I10 ESSENTIAL HYPERTENSION: Primary | ICD-10-CM

## 2025-01-24 DIAGNOSIS — I10 PRIMARY HYPERTENSION: ICD-10-CM

## 2025-01-24 DIAGNOSIS — I10 ESSENTIAL HYPERTENSION: ICD-10-CM

## 2025-01-24 DIAGNOSIS — Z95.5 STENTED CORONARY ARTERY: ICD-10-CM

## 2025-01-24 DIAGNOSIS — I25.10 ATHEROSCLEROSIS OF NATIVE CORONARY ARTERY OF NATIVE HEART WITHOUT ANGINA PECTORIS: ICD-10-CM

## 2025-01-24 DIAGNOSIS — E74.819 DISORDERS OF GLUCOSE TRANSPORT, UNSPECIFIED (HCC): ICD-10-CM

## 2025-01-24 DIAGNOSIS — E27.3 ADRENAL INSUFFICIENCY DUE TO CORTICOSTEROID WITHDRAWAL (HCC): ICD-10-CM

## 2025-01-24 DIAGNOSIS — T38.0X5A ADRENAL INSUFFICIENCY DUE TO CORTICOSTEROID WITHDRAWAL (HCC): ICD-10-CM

## 2025-01-24 LAB
ALBUMIN SERPL-MCNC: 4.3 G/DL (ref 3.4–5)
ALBUMIN/GLOB SERPL: 2 {RATIO} (ref 1.1–2.2)
ALP SERPL-CCNC: 93 U/L (ref 40–129)
ALT SERPL-CCNC: 15 U/L (ref 10–40)
ANION GAP SERPL CALCULATED.3IONS-SCNC: 12 MMOL/L (ref 3–16)
AST SERPL-CCNC: 24 U/L (ref 15–37)
BASOPHILS # BLD: 0.1 K/UL (ref 0–0.2)
BASOPHILS NFR BLD: 1.1 %
BILIRUB SERPL-MCNC: 0.5 MG/DL (ref 0–1)
BUN SERPL-MCNC: 16 MG/DL (ref 7–20)
CALCIUM SERPL-MCNC: 8.9 MG/DL (ref 8.3–10.6)
CHLORIDE SERPL-SCNC: 105 MMOL/L (ref 99–110)
CHOLEST SERPL-MCNC: 136 MG/DL (ref 0–199)
CO2 SERPL-SCNC: 27 MMOL/L (ref 21–32)
CREAT SERPL-MCNC: 0.7 MG/DL (ref 0.8–1.3)
DEPRECATED RDW RBC AUTO: 16.8 % (ref 12.4–15.4)
EOSINOPHIL # BLD: 1 K/UL (ref 0–0.6)
EOSINOPHIL NFR BLD: 15.7 %
GFR SERPLBLD CREATININE-BSD FMLA CKD-EPI: >90 ML/MIN/{1.73_M2}
GLUCOSE SERPL-MCNC: 111 MG/DL (ref 70–99)
HCT VFR BLD AUTO: 42.1 % (ref 40.5–52.5)
HDLC SERPL-MCNC: 37 MG/DL (ref 40–60)
HGB BLD-MCNC: 14.3 G/DL (ref 13.5–17.5)
LDLC SERPL CALC-MCNC: 74 MG/DL
LYMPHOCYTES # BLD: 1.1 K/UL (ref 1–5.1)
LYMPHOCYTES NFR BLD: 16.5 %
MCH RBC QN AUTO: 31.2 PG (ref 26–34)
MCHC RBC AUTO-ENTMCNC: 33.9 G/DL (ref 31–36)
MCV RBC AUTO: 92.1 FL (ref 80–100)
MONOCYTES # BLD: 0.5 K/UL (ref 0–1.3)
MONOCYTES NFR BLD: 7.4 %
NEUTROPHILS # BLD: 3.8 K/UL (ref 1.7–7.7)
NEUTROPHILS NFR BLD: 59.3 %
PLATELET # BLD AUTO: 180 K/UL (ref 135–450)
PMV BLD AUTO: 9.6 FL (ref 5–10.5)
POTASSIUM SERPL-SCNC: 4.1 MMOL/L (ref 3.5–5.1)
PROT SERPL-MCNC: 6.4 G/DL (ref 6.4–8.2)
PSA SERPL DL<=0.01 NG/ML-MCNC: 6 NG/ML (ref 0–4)
RBC # BLD AUTO: 4.57 M/UL (ref 4.2–5.9)
SODIUM SERPL-SCNC: 144 MMOL/L (ref 136–145)
TRIGL SERPL-MCNC: 125 MG/DL (ref 0–150)
VLDLC SERPL CALC-MCNC: 25 MG/DL
WBC # BLD AUTO: 6.4 K/UL (ref 4–11)

## 2025-01-24 PROCEDURE — 3078F DIAST BP <80 MM HG: CPT | Performed by: INTERNAL MEDICINE

## 2025-01-24 PROCEDURE — 1159F MED LIST DOCD IN RCRD: CPT | Performed by: INTERNAL MEDICINE

## 2025-01-24 PROCEDURE — 99214 OFFICE O/P EST MOD 30 MIN: CPT | Performed by: INTERNAL MEDICINE

## 2025-01-24 PROCEDURE — 3074F SYST BP LT 130 MM HG: CPT | Performed by: INTERNAL MEDICINE

## 2025-01-24 PROCEDURE — 1123F ACP DISCUSS/DSCN MKR DOCD: CPT | Performed by: INTERNAL MEDICINE

## 2025-01-24 PROCEDURE — 1160F RVW MEDS BY RX/DR IN RCRD: CPT | Performed by: INTERNAL MEDICINE

## 2025-01-25 LAB
EST. AVERAGE GLUCOSE BLD GHB EST-MCNC: 111.2 MG/DL
HBA1C MFR BLD: 5.5 %

## 2025-01-27 ENCOUNTER — OFFICE VISIT (OUTPATIENT)
Dept: FAMILY MEDICINE CLINIC | Age: 80
End: 2025-01-27

## 2025-01-27 VITALS
BODY MASS INDEX: 26.51 KG/M2 | WEIGHT: 179 LBS | HEART RATE: 57 BPM | SYSTOLIC BLOOD PRESSURE: 136 MMHG | HEIGHT: 69 IN | OXYGEN SATURATION: 96 % | DIASTOLIC BLOOD PRESSURE: 70 MMHG

## 2025-01-27 DIAGNOSIS — M81.0 OSTEOPOROSIS, UNSPECIFIED OSTEOPOROSIS TYPE, UNSPECIFIED PATHOLOGICAL FRACTURE PRESENCE: ICD-10-CM

## 2025-01-27 DIAGNOSIS — G47.09 OTHER INSOMNIA: ICD-10-CM

## 2025-01-27 DIAGNOSIS — Z95.5 STENTED CORONARY ARTERY: ICD-10-CM

## 2025-01-27 DIAGNOSIS — G30.9 SEVERE ALZHEIMER'S DEMENTIA WITHOUT BEHAVIORAL DISTURBANCE, PSYCHOTIC DISTURBANCE, MOOD DISTURBANCE, OR ANXIETY, UNSPECIFIED TIMING OF DEMENTIA ONSET (HCC): ICD-10-CM

## 2025-01-27 DIAGNOSIS — E78.2 MIXED HYPERLIPIDEMIA: ICD-10-CM

## 2025-01-27 DIAGNOSIS — Z00.00 INITIAL MEDICARE ANNUAL WELLNESS VISIT: Primary | ICD-10-CM

## 2025-01-27 DIAGNOSIS — E55.9 VITAMIN D DEFICIENCY: ICD-10-CM

## 2025-01-27 DIAGNOSIS — Z23 NEED FOR INFLUENZA VACCINATION: ICD-10-CM

## 2025-01-27 DIAGNOSIS — I10 ESSENTIAL HYPERTENSION: ICD-10-CM

## 2025-01-27 DIAGNOSIS — F32.89 OTHER DEPRESSION: ICD-10-CM

## 2025-01-27 DIAGNOSIS — I25.10 ATHEROSCLEROSIS OF NATIVE CORONARY ARTERY OF NATIVE HEART WITHOUT ANGINA PECTORIS: ICD-10-CM

## 2025-01-27 DIAGNOSIS — F02.C0 SEVERE ALZHEIMER'S DEMENTIA WITHOUT BEHAVIORAL DISTURBANCE, PSYCHOTIC DISTURBANCE, MOOD DISTURBANCE, OR ANXIETY, UNSPECIFIED TIMING OF DEMENTIA ONSET (HCC): ICD-10-CM

## 2025-01-27 PROBLEM — T38.0X5A: Status: RESOLVED | Noted: 2023-04-18 | Resolved: 2025-01-27

## 2025-01-27 PROBLEM — E09.9: Status: RESOLVED | Noted: 2023-04-18 | Resolved: 2025-01-27

## 2025-01-27 ASSESSMENT — PATIENT HEALTH QUESTIONNAIRE - PHQ9
SUM OF ALL RESPONSES TO PHQ9 QUESTIONS 1 & 2: 0
7. TROUBLE CONCENTRATING ON THINGS, SUCH AS READING THE NEWSPAPER OR WATCHING TELEVISION: NOT AT ALL
SUM OF ALL RESPONSES TO PHQ QUESTIONS 1-9: 0
1. LITTLE INTEREST OR PLEASURE IN DOING THINGS: NOT AT ALL
10. IF YOU CHECKED OFF ANY PROBLEMS, HOW DIFFICULT HAVE THESE PROBLEMS MADE IT FOR YOU TO DO YOUR WORK, TAKE CARE OF THINGS AT HOME, OR GET ALONG WITH OTHER PEOPLE: NOT DIFFICULT AT ALL
6. FEELING BAD ABOUT YOURSELF - OR THAT YOU ARE A FAILURE OR HAVE LET YOURSELF OR YOUR FAMILY DOWN: NOT AT ALL
5. POOR APPETITE OR OVEREATING: NOT AT ALL
3. TROUBLE FALLING OR STAYING ASLEEP: NOT AT ALL
SUM OF ALL RESPONSES TO PHQ QUESTIONS 1-9: 0
SUM OF ALL RESPONSES TO PHQ QUESTIONS 1-9: 0
4. FEELING TIRED OR HAVING LITTLE ENERGY: NOT AT ALL
9. THOUGHTS THAT YOU WOULD BE BETTER OFF DEAD, OR OF HURTING YOURSELF: NOT AT ALL
SUM OF ALL RESPONSES TO PHQ QUESTIONS 1-9: 0
2. FEELING DOWN, DEPRESSED OR HOPELESS: NOT AT ALL
8. MOVING OR SPEAKING SO SLOWLY THAT OTHER PEOPLE COULD HAVE NOTICED. OR THE OPPOSITE, BEING SO FIGETY OR RESTLESS THAT YOU HAVE BEEN MOVING AROUND A LOT MORE THAN USUAL: NOT AT ALL

## 2025-01-27 ASSESSMENT — LIFESTYLE VARIABLES
HOW MANY STANDARD DRINKS CONTAINING ALCOHOL DO YOU HAVE ON A TYPICAL DAY: 1 OR 2
HOW OFTEN DO YOU HAVE A DRINK CONTAINING ALCOHOL: MONTHLY OR LESS

## 2025-01-27 NOTE — PROGRESS NOTES
Final    MCV 01/24/2025 92.1  80.0 - 100.0 fL Final    MCH 01/24/2025 31.2  26.0 - 34.0 pg Final    MCHC 01/24/2025 33.9  31.0 - 36.0 g/dL Final    RDW 01/24/2025 16.8 (H)  12.4 - 15.4 % Final    Platelets 01/24/2025 180  135 - 450 K/uL Final    MPV 01/24/2025 9.6  5.0 - 10.5 fL Final    Neutrophils % 01/24/2025 59.3  % Final    Lymphocytes % 01/24/2025 16.5  % Final    Monocytes % 01/24/2025 7.4  % Final    Eosinophils % 01/24/2025 15.7  % Final    Basophils % 01/24/2025 1.1  % Final    Neutrophils Absolute 01/24/2025 3.8  1.7 - 7.7 K/uL Final    Lymphocytes Absolute 01/24/2025 1.1  1.0 - 5.1 K/uL Final    Monocytes Absolute 01/24/2025 0.5  0.0 - 1.3 K/uL Final    Eosinophils Absolute 01/24/2025 1.0 (H)  0.0 - 0.6 K/uL Final    Basophils Absolute 01/24/2025 0.1  0.0 - 0.2 K/uL Final           History of Present Illness  The patient is a 79-year-old male who presents for a routine checkup.    He has been diagnosed with coronary artery disease. He reports no symptoms of headaches, dizziness, chest pain, or respiratory distress. He also reports no gastrointestinal symptoms such as abdominal pain, nausea, vomiting, diarrhea, or constipation. His urinary function is normal, and he maintains a healthy appetite. He does not have any sores, blisters, or ulcers. He reports no peripheral edema in his lower extremities. He has experienced some weight loss since his last visit. He has an upcoming appointment with Dr. Sebastian. He is currently on a daily regimen of baby aspirin and Plavix.    He has a history of hyperlipidemia. He is on Lipitor 80 mg at night.    He has a history of hypertension. He is on losartan 100 mg daily and metoprolol 25 mg twice a day.    He has a history of benign prostatic hyperplasia. He is on finasteride 5 mg daily and Flomax every night to help him urinate.    He has a history of gastroesophageal reflux disease. He is on Carafate.    He has a history of chronic pain. He is on Cymbalta 30 mg.    He

## 2025-02-06 RX ORDER — ISOPROPYL ALCOHOL 70 ML/100ML
SWAB TOPICAL
Qty: 200 EACH | Status: SHIPPED | OUTPATIENT
Start: 2025-02-06

## 2025-02-06 NOTE — TELEPHONE ENCOUNTER
Medication:   Requested Prescriptions     Pending Prescriptions Disp Refills    Alcohol Swabs (EASY TOUCH ALCOHOL PREP MEDIUM) 70 % PADS [Pharmacy Med Name: Easy Touch Alcohol Prep Medium 70 % Pads]  PRN     Sig: USE UP TO 7X DAILY WITH INSULIN AND TO CHECK BLOOD GLUCOSE     Last Filled:      Last appt: 1/27/2025   Next appt: 7/30/2025    Last OARRS:        No data to display

## 2025-02-10 DIAGNOSIS — R73.9 BLOOD GLUCOSE ELEVATED: ICD-10-CM

## 2025-02-10 RX ORDER — INSULIN ASPART INJECTION 100 [IU]/ML
INJECTION, SOLUTION SUBCUTANEOUS
Qty: 6 ML | Status: SHIPPED | OUTPATIENT
Start: 2025-02-10 | End: 2025-02-10 | Stop reason: ALTCHOICE

## 2025-02-10 RX ORDER — PEN NEEDLE, DIABETIC, SAFETY 30 GX3/16"
NEEDLE, DISPOSABLE MISCELLANEOUS
Qty: 100 EACH | Status: SHIPPED | OUTPATIENT
Start: 2025-02-10

## 2025-02-10 NOTE — TELEPHONE ENCOUNTER
MA spoke with Consuelo at [facility] to remove the insulin and pen needles from the patient's chart, as they are no longer taking insulin. No further action needed.

## 2025-03-05 RX ORDER — PSEUDOEPHED/ACETAMINOPH/DIPHEN 30MG-500MG
TABLET ORAL
Qty: 30 TABLET | Status: SHIPPED | OUTPATIENT
Start: 2025-03-05

## 2025-03-05 NOTE — TELEPHONE ENCOUNTER
Medication:   Requested Prescriptions     Pending Prescriptions Disp Refills    Acetaminophen Extra Strength 500 MG TABS [Pharmacy Med Name: Acetaminophen Extra Strength 500 MG Tablet] 30 tablet PRN     Sig: TAKE 1 TABLET BY MOUTH EVERY EIGHT HOURS AS NEEDED  (MAX 3GMS/APAP PER 24HRS)     Last Filled:      Last appt: 1/27/2025   Next appt: 7/30/2025

## 2025-03-10 RX ORDER — INSULIN GLARGINE 100 [IU]/ML
INJECTION, SOLUTION SUBCUTANEOUS
Qty: 6 ML | Status: SHIPPED | OUTPATIENT
Start: 2025-03-10

## 2025-03-10 NOTE — TELEPHONE ENCOUNTER
Medication:   Requested Prescriptions     Pending Prescriptions Disp Refills    BASAGLAR KWIKPEN 100 UNIT/ML injection pen [Pharmacy Med Name: Basaglar KwikPen 100 UNIT/ML Solution pen-injector] 6 mL PRN     Sig: INJECT AT BEDTIME: 20 UNITS SUBCUTANEOUSLY (BEYOND USE 28 DAYS)     Last Filled:      Last appt: 1/27/2025   Next appt: 7/30/2025    Last OARRS:        No data to display

## 2025-03-26 ENCOUNTER — TELEPHONE (OUTPATIENT)
Dept: FAMILY MEDICINE CLINIC | Age: 80
End: 2025-03-26

## 2025-03-26 NOTE — TELEPHONE ENCOUNTER
Shae from [facility] says that the patient is experiencing nausea and loose stool. There is a virus going around the facility and they need an order for Imodium for the patient. Please contact Shae with any additional questions at [phone], thanks

## 2025-03-27 NOTE — TELEPHONE ENCOUNTER
Shae returning call. Verbal order given for Immodium. No additional questions or concerns at this time.

## (undated) DEVICE — TRAP SPEC POLYPR SGL CHMBR FN MESH SCRN

## (undated) DEVICE — ELECTRODE ECG MONITR FOAM TEAR DROP ADLT RED

## (undated) DEVICE — ENDOSCOPIC KIT 2 12 FT OP4 DE2 GWN SYR

## (undated) DEVICE — FORCEPS BX L240CM JAW DIA2.8MM L CAP W/ NDL MIC MESH TOOTH

## (undated) DEVICE — AIRLIFE™ NASAL OXYGEN CANNULA CURVED, FLARED TIP, WITH 7 FEET (2.1 M) CRUSH RESISTANT TUBING, OVER-THE-EAR STYLE: Brand: AIRLIFE™